# Patient Record
Sex: FEMALE | Race: BLACK OR AFRICAN AMERICAN | NOT HISPANIC OR LATINO | Employment: OTHER | URBAN - METROPOLITAN AREA
[De-identification: names, ages, dates, MRNs, and addresses within clinical notes are randomized per-mention and may not be internally consistent; named-entity substitution may affect disease eponyms.]

---

## 2023-05-03 ENCOUNTER — PATIENT OUTREACH (OUTPATIENT)
Dept: CARE COORDINATION | Facility: CLINIC | Age: 63
End: 2023-05-03
Payer: COMMERCIAL

## 2023-05-30 LAB
ALANINE AMINOTRANSFERASE (SGPT) (U/L) IN SER/PLAS: 5 U/L (ref 7–45)
ALBUMIN (G/DL) IN SER/PLAS: 4.3 G/DL (ref 3.4–5)
ALKALINE PHOSPHATASE (U/L) IN SER/PLAS: 88 U/L (ref 33–136)
ANION GAP IN SER/PLAS: 13 MMOL/L (ref 10–20)
ASPARTATE AMINOTRANSFERASE (SGOT) (U/L) IN SER/PLAS: 13 U/L (ref 9–39)
BASOPHILS (10*3/UL) IN BLOOD BY AUTOMATED COUNT: 0.04 X10E9/L (ref 0–0.1)
BASOPHILS/100 LEUKOCYTES IN BLOOD BY AUTOMATED COUNT: 0.7 % (ref 0–2)
BILIRUBIN TOTAL (MG/DL) IN SER/PLAS: 0.4 MG/DL (ref 0–1.2)
CALCIUM (MG/DL) IN SER/PLAS: 9.6 MG/DL (ref 8.6–10.6)
CARBON DIOXIDE, TOTAL (MMOL/L) IN SER/PLAS: 27 MMOL/L (ref 21–32)
CD3+CD4+ ABSOLUTE: 0.18 X10E9/L (ref 0.35–2.74)
CD3+CD8+ ABSOLUTE: 0.9 X10E9/L (ref 0.08–1.49)
CD4/CD8 RATIO: 0.2 (ref 1–3.5)
CD45%: 100 %
CHLORIDE (MMOL/L) IN SER/PLAS: 106 MMOL/L (ref 98–107)
CHOLESTEROL (MG/DL) IN SER/PLAS: 275 MG/DL (ref 0–199)
CHOLESTEROL IN HDL (MG/DL) IN SER/PLAS: 76.5 MG/DL
CHOLESTEROL/HDL RATIO: 3.6
CP CD3+CD4+%: 14 % (ref 29–57)
CP CD3+CD8+%: 70 % (ref 7–31)
CREATININE (MG/DL) IN SER/PLAS: 1.12 MG/DL (ref 0.5–1.05)
EOSINOPHILS (10*3/UL) IN BLOOD BY AUTOMATED COUNT: 0.07 X10E9/L (ref 0–0.7)
EOSINOPHILS/100 LEUKOCYTES IN BLOOD BY AUTOMATED COUNT: 1.3 % (ref 0–6)
ERYTHROCYTE DISTRIBUTION WIDTH (RATIO) BY AUTOMATED COUNT: 13 % (ref 11.5–14.5)
ERYTHROCYTE MEAN CORPUSCULAR HEMOGLOBIN CONCENTRATION (G/DL) BY AUTOMATED: 32.6 G/DL (ref 32–36)
ERYTHROCYTE MEAN CORPUSCULAR VOLUME (FL) BY AUTOMATED COUNT: 99 FL (ref 80–100)
ERYTHROCYTES (10*6/UL) IN BLOOD BY AUTOMATED COUNT: 4.42 X10E12/L (ref 4–5.2)
FMETH: ABNORMAL
FSIT1: ABNORMAL
GFR FEMALE: 55 ML/MIN/1.73M2
GLUCOSE (MG/DL) IN SER/PLAS: 63 MG/DL (ref 74–99)
HEMATOCRIT (%) IN BLOOD BY AUTOMATED COUNT: 43.8 % (ref 36–46)
HEMOGLOBIN (G/DL) IN BLOOD: 14.3 G/DL (ref 12–16)
IMMATURE GRANULOCYTES/100 LEUKOCYTES IN BLOOD BY AUTOMATED COUNT: 0.5 % (ref 0–0.9)
LDL: 175 MG/DL (ref 0–99)
LEUKOCYTES (10*3/UL) IN BLOOD BY AUTOMATED COUNT: 5.5 X10E9/L (ref 4.4–11.3)
LYMPHOCYTES (10*3/UL) IN BLOOD BY AUTOMATED COUNT: 1.29 X10E9/L (ref 1.2–4.8)
LYMPHOCYTES/100 LEUKOCYTES IN BLOOD BY AUTOMATED COUNT: 23.5 % (ref 13–44)
MONOCYTES (10*3/UL) IN BLOOD BY AUTOMATED COUNT: 0.61 X10E9/L (ref 0.1–1)
MONOCYTES/100 LEUKOCYTES IN BLOOD BY AUTOMATED COUNT: 11.1 % (ref 2–10)
NEUTROPHILS (10*3/UL) IN BLOOD BY AUTOMATED COUNT: 3.44 X10E9/L (ref 1.2–7.7)
NEUTROPHILS/100 LEUKOCYTES IN BLOOD BY AUTOMATED COUNT: 62.9 % (ref 40–80)
NRBC (PER 100 WBCS) BY AUTOMATED COUNT: 0 /100 WBC (ref 0–0)
PLATELETS (10*3/UL) IN BLOOD AUTOMATED COUNT: 366 X10E9/L (ref 150–450)
POTASSIUM (MMOL/L) IN SER/PLAS: 5 MMOL/L (ref 3.5–5.3)
PROTEIN TOTAL: 8 G/DL (ref 6.4–8.2)
SODIUM (MMOL/L) IN SER/PLAS: 141 MMOL/L (ref 136–145)
THYROTROPIN (MIU/L) IN SER/PLAS BY DETECTION LIMIT <= 0.05 MIU/L: 0.38 MIU/L (ref 0.44–3.98)
THYROXINE (T4) FREE (NG/DL) IN SER/PLAS: 1 NG/DL (ref 0.78–1.48)
TRIGLYCERIDE (MG/DL) IN SER/PLAS: 118 MG/DL (ref 0–149)
UREA NITROGEN (MG/DL) IN SER/PLAS: 14 MG/DL (ref 6–23)
VLDL: 24 MG/DL (ref 0–40)

## 2023-05-31 LAB
HIV-1 RNA PCR VIRAL LOAD LOG: NORMAL LOG10 CPY/ML
HIV-1 RNA VIRAL LOAD: NOT DETECTED COPIES/ML

## 2023-06-24 LAB — HEPARIN UNFRACTIONATED IN PPP BY CHROMOGENIC MET: 0.2 IU/ML

## 2023-06-27 ENCOUNTER — PATIENT OUTREACH (OUTPATIENT)
Dept: CARE COORDINATION | Facility: CLINIC | Age: 63
End: 2023-06-27
Payer: COMMERCIAL

## 2023-06-27 NOTE — CARE PLAN
Spoke with patient....enrolled in [employer].   Patient contacted by [facility] and PT will be out this week.

## 2023-07-21 ENCOUNTER — PATIENT OUTREACH (OUTPATIENT)
Dept: CARE COORDINATION | Facility: CLINIC | Age: 63
End: 2023-07-21
Payer: COMMERCIAL

## 2023-07-21 NOTE — CARE PLAN
LATE ENTRY:   Outreach call to patient.   Reviewed meds.  Patient verbalized feeling better and has an upcoming appointment with PCP.   No additional needs noted at this time.       Problem: Access to Care Issue  Goal: Assess and Address Access Barriers  Outcome: Progressing  Note: Late entry from 07/20/2023--Pateint verbalized that she was able to schedule an upcoming appointment is 08/012023.

## 2023-08-11 ENCOUNTER — PATIENT OUTREACH (OUTPATIENT)
Dept: CARE COORDINATION | Facility: CLINIC | Age: 63
End: 2023-08-11
Payer: COMMERCIAL

## 2023-10-20 DIAGNOSIS — Z95.820 PERIPHERAL VASCULAR ANGIOPLASTY STATUS WITH IMPLANTS AND GRAFTS: ICD-10-CM

## 2023-10-25 RX ORDER — RIVAROXABAN 20 MG/1
20 TABLET, FILM COATED ORAL DAILY
Qty: 90 TABLET | Refills: 3 | Status: SHIPPED | OUTPATIENT
Start: 2023-10-25

## 2023-10-25 RX ORDER — CLOPIDOGREL BISULFATE 75 MG/1
75 TABLET ORAL DAILY
Qty: 90 TABLET | Refills: 3 | Status: SHIPPED | OUTPATIENT
Start: 2023-10-25

## 2023-12-18 DIAGNOSIS — B20 HUMAN IMMUNODEFICIENCY VIRUS (MULTI): Primary | ICD-10-CM

## 2023-12-18 RX ORDER — BICTEGRAVIR SODIUM, EMTRICITABINE, AND TENOFOVIR ALAFENAMIDE FUMARATE 50; 200; 25 MG/1; MG/1; MG/1
1 TABLET ORAL DAILY
Qty: 30 TABLET | Refills: 5 | Status: SHIPPED | OUTPATIENT
Start: 2023-12-18 | End: 2024-06-04

## 2024-02-01 ENCOUNTER — APPOINTMENT (OUTPATIENT)
Dept: IMMUNOLOGY | Facility: CLINIC | Age: 64
End: 2024-02-01
Payer: COMMERCIAL

## 2024-02-01 DIAGNOSIS — B20 HIV DISEASE (MULTI): Primary | ICD-10-CM

## 2024-02-01 PROBLEM — C34.91 NON-SMALL CELL CANCER OF RIGHT LUNG (MULTI): Status: ACTIVE | Noted: 2024-02-01

## 2024-02-01 PROBLEM — I73.9 PERIPHERAL VASCULAR DISEASE (CMS-HCC): Status: ACTIVE | Noted: 2022-01-14

## 2024-02-01 PROBLEM — I10 HYPERTENSION: Status: ACTIVE | Noted: 2022-01-14

## 2024-02-01 RX ORDER — LISINOPRIL 40 MG/1
40 TABLET ORAL DAILY
COMMUNITY
End: 2024-05-24 | Stop reason: SDUPTHER

## 2024-02-01 RX ORDER — CILOSTAZOL 100 MG/1
100 TABLET ORAL 2 TIMES DAILY
COMMUNITY

## 2024-02-01 RX ORDER — HYDROCHLOROTHIAZIDE 25 MG/1
25 TABLET ORAL DAILY
COMMUNITY
End: 2024-02-13 | Stop reason: SDUPTHER

## 2024-02-12 RX ORDER — HYDROCHLOROTHIAZIDE 25 MG/1
25 TABLET ORAL DAILY
Qty: 30 TABLET | Refills: 0 | OUTPATIENT
Start: 2024-02-12

## 2024-02-12 NOTE — TELEPHONE ENCOUNTER
This is a historic medication reordered while Pt was in hospital. Pt should contact the original prescriber of medication or PCP for refill and ongoing management.

## 2024-02-13 DIAGNOSIS — I10 HYPERTENSION, UNSPECIFIED TYPE: Primary | ICD-10-CM

## 2024-02-13 RX ORDER — HYDROCHLOROTHIAZIDE 25 MG/1
25 TABLET ORAL DAILY
Qty: 30 TABLET | Refills: 3 | Status: SHIPPED | OUTPATIENT
Start: 2024-02-13 | End: 2024-06-04

## 2024-03-12 ENCOUNTER — OFFICE VISIT (OUTPATIENT)
Dept: IMMUNOLOGY | Facility: CLINIC | Age: 64
End: 2024-03-12
Payer: COMMERCIAL

## 2024-03-12 VITALS
SYSTOLIC BLOOD PRESSURE: 113 MMHG | RESPIRATION RATE: 16 BRPM | WEIGHT: 112 LBS | HEIGHT: 66 IN | DIASTOLIC BLOOD PRESSURE: 74 MMHG | BODY MASS INDEX: 18 KG/M2 | TEMPERATURE: 97.6 F | HEART RATE: 94 BPM | OXYGEN SATURATION: 98 %

## 2024-03-12 DIAGNOSIS — B20 HIV DISEASE (MULTI): Primary | ICD-10-CM

## 2024-03-12 DIAGNOSIS — I10 HYPERTENSION, UNSPECIFIED TYPE: ICD-10-CM

## 2024-03-12 DIAGNOSIS — C34.91 NON-SMALL CELL CANCER OF RIGHT LUNG (MULTI): ICD-10-CM

## 2024-03-12 DIAGNOSIS — Z00.00 HEALTHCARE MAINTENANCE: ICD-10-CM

## 2024-03-12 DIAGNOSIS — I73.9 PERIPHERAL VASCULAR DISEASE (CMS-HCC): ICD-10-CM

## 2024-03-12 PROBLEM — R23.2 HOT FLASHES: Status: ACTIVE | Noted: 2024-03-12

## 2024-03-12 LAB
ALBUMIN SERPL BCP-MCNC: 4.5 G/DL (ref 3.4–5)
ALP SERPL-CCNC: 95 U/L (ref 33–136)
ALT SERPL W P-5'-P-CCNC: 6 U/L (ref 7–45)
ANION GAP SERPL CALC-SCNC: 14 MMOL/L (ref 10–20)
AST SERPL W P-5'-P-CCNC: 13 U/L (ref 9–39)
BASOPHILS # BLD AUTO: 0.05 X10*3/UL (ref 0–0.1)
BASOPHILS NFR BLD AUTO: 0.8 %
BILIRUB SERPL-MCNC: 0.3 MG/DL (ref 0–1.2)
BUN SERPL-MCNC: 15 MG/DL (ref 6–23)
CALCIUM SERPL-MCNC: 9.7 MG/DL (ref 8.6–10.6)
CHLORIDE SERPL-SCNC: 100 MMOL/L (ref 98–107)
CHOLEST SERPL-MCNC: 211 MG/DL (ref 0–199)
CHOLESTEROL/HDL RATIO: 3
CO2 SERPL-SCNC: 28 MMOL/L (ref 21–32)
CREAT SERPL-MCNC: 0.89 MG/DL (ref 0.5–1.05)
EGFRCR SERPLBLD CKD-EPI 2021: 72 ML/MIN/1.73M*2
EOSINOPHIL # BLD AUTO: 0.12 X10*3/UL (ref 0–0.7)
EOSINOPHIL NFR BLD AUTO: 2 %
ERYTHROCYTE [DISTWIDTH] IN BLOOD BY AUTOMATED COUNT: 14 % (ref 11.5–14.5)
GLUCOSE SERPL-MCNC: 73 MG/DL (ref 74–99)
HCT VFR BLD AUTO: 42.5 % (ref 36–46)
HDLC SERPL-MCNC: 71.3 MG/DL
HGB BLD-MCNC: 13.3 G/DL (ref 12–16)
IMM GRANULOCYTES # BLD AUTO: 0.02 X10*3/UL (ref 0–0.7)
IMM GRANULOCYTES NFR BLD AUTO: 0.3 % (ref 0–0.9)
LDLC SERPL CALC-MCNC: 115 MG/DL
LYMPHOCYTES # BLD AUTO: 1.88 X10*3/UL (ref 1.2–4.8)
LYMPHOCYTES NFR BLD AUTO: 31.8 %
MCH RBC QN AUTO: 29.4 PG (ref 26–34)
MCHC RBC AUTO-ENTMCNC: 31.3 G/DL (ref 32–36)
MCV RBC AUTO: 94 FL (ref 80–100)
MONOCYTES # BLD AUTO: 0.6 X10*3/UL (ref 0.1–1)
MONOCYTES NFR BLD AUTO: 10.2 %
NEUTROPHILS # BLD AUTO: 3.24 X10*3/UL (ref 1.2–7.7)
NEUTROPHILS NFR BLD AUTO: 54.9 %
NON HDL CHOLESTEROL: 140 MG/DL (ref 0–149)
NRBC BLD-RTO: 0 /100 WBCS (ref 0–0)
PLATELET # BLD AUTO: 443 X10*3/UL (ref 150–450)
POTASSIUM SERPL-SCNC: 4.6 MMOL/L (ref 3.5–5.3)
PROT SERPL-MCNC: 8.5 G/DL (ref 6.4–8.2)
RBC # BLD AUTO: 4.53 X10*6/UL (ref 4–5.2)
SODIUM SERPL-SCNC: 137 MMOL/L (ref 136–145)
TRIGL SERPL-MCNC: 125 MG/DL (ref 0–149)
VLDL: 25 MG/DL (ref 0–40)
WBC # BLD AUTO: 5.9 X10*3/UL (ref 4.4–11.3)

## 2024-03-12 PROCEDURE — 80061 LIPID PANEL: CPT | Performed by: INTERNAL MEDICINE

## 2024-03-12 PROCEDURE — 3078F DIAST BP <80 MM HG: CPT | Performed by: INTERNAL MEDICINE

## 2024-03-12 PROCEDURE — 1036F TOBACCO NON-USER: CPT | Performed by: INTERNAL MEDICINE

## 2024-03-12 PROCEDURE — 88185 FLOWCYTOMETRY/TC ADD-ON: CPT | Mod: TC | Performed by: INTERNAL MEDICINE

## 2024-03-12 PROCEDURE — 3008F BODY MASS INDEX DOCD: CPT | Performed by: INTERNAL MEDICINE

## 2024-03-12 PROCEDURE — 3074F SYST BP LT 130 MM HG: CPT | Performed by: INTERNAL MEDICINE

## 2024-03-12 PROCEDURE — 85025 COMPLETE CBC W/AUTO DIFF WBC: CPT | Performed by: INTERNAL MEDICINE

## 2024-03-12 PROCEDURE — 99215 OFFICE O/P EST HI 40 MIN: CPT | Performed by: INTERNAL MEDICINE

## 2024-03-12 PROCEDURE — 87536 HIV-1 QUANT&REVRSE TRNSCRPJ: CPT | Performed by: INTERNAL MEDICINE

## 2024-03-12 PROCEDURE — 1159F MED LIST DOCD IN RCRD: CPT | Performed by: INTERNAL MEDICINE

## 2024-03-12 PROCEDURE — 80053 COMPREHEN METABOLIC PANEL: CPT | Performed by: INTERNAL MEDICINE

## 2024-03-12 PROCEDURE — 1126F AMNT PAIN NOTED NONE PRSNT: CPT | Performed by: INTERNAL MEDICINE

## 2024-03-12 PROCEDURE — 36415 COLL VENOUS BLD VENIPUNCTURE: CPT | Performed by: INTERNAL MEDICINE

## 2024-03-12 RX ORDER — ATORVASTATIN CALCIUM 40 MG/1
40 TABLET, FILM COATED ORAL DAILY
Qty: 30 TABLET | Refills: 5 | Status: SHIPPED | OUTPATIENT
Start: 2024-03-12 | End: 2024-06-05 | Stop reason: ALTCHOICE

## 2024-03-12 ASSESSMENT — PAIN SCALES - GENERAL: PAINLEVEL: 0-NO PAIN

## 2024-03-12 NOTE — ASSESSMENT & PLAN NOTE
Seems lost to follow up, as last visit 2/23 and was supposed to follow up in 6 months with CT before. With her right upper chest pain, will get the CT and then back to oncology

## 2024-03-12 NOTE — PROGRESS NOTES
"Subjective   Rosenda Anderson is a 63 y.o. female who presents to the [facility] for follow-up of HIV infection.     Has had a tough couple of years.  Finally got her left leg revascularized, doing ok.  But son convicted of murder under influence of PCP(aka \"water\"),he doesn't remember it. Was in the news end of .  Her mother  on Valentines day-they had her home with hospice. She is still getting calls from hospice to help with grief. Then her good friend fell at the repast and had very severe open leg fractures.   But despite all she is taking her medicine everyday!  Only thing is she has noted some right upper breast/pectoralis pain-no mass there.  Objective   Vitals:    24 1437   BP: 113/74   Pulse: 94   Resp: 16   Temp: 36.4 °C (97.6 °F)   SpO2: 98%        Current Outpatient Medications:     barjooesv-yiubolqk-epdwyow ala (Biktarvy) -25 mg tablet, TAKE 1 TABLET BY MOUTH DAILY, Disp: 30 tablet, Rfl: 5    cilostazol (Pletal) 100 mg tablet, Take 1 tablet (100 mg) by mouth 2 times a day., Disp: , Rfl:     hydroCHLOROthiazide (HYDRODiuril) 25 mg tablet, Take 1 tablet (25 mg) by mouth once daily., Disp: 30 tablet, Rfl: 3    lisinopril 40 mg tablet, Take 1 tablet (40 mg) by mouth once daily., Disp: , Rfl:     Xarelto 20 mg tablet, TAKE 1 TABLET DAILY, Disp: 90 tablet, Rfl: 3    atorvastatin (Lipitor) 40 mg tablet, Take 1 tablet (40 mg) by mouth once daily., Disp: 30 tablet, Rfl: 5    clopidogrel (Plavix) 75 mg tablet, TAKE 1 TABLET DAILY (Patient not taking: Reported on 3/12/2024), Disp: 90 tablet, Rfl: 3   Physical Exam  Physical Exam  Constitutional:       General: not in acute distress.     Appearance: Normal appearance.   HENT:      Head: Normocephalic and atraumatic.      Pharynx: Oropharynx is clear. No oropharyngeal exudate. Prominent right parotid with no discrete mass  Eyes:      General: No scleral icterus.  Breast:no masses on right, no R axilllary JAYLENE, Area of tenderness is more over the " "pectoralis than the breat, ? muscular  Cardiovascular:      Rate and Rhythm: Normal rate and regular rhythm.   Pulmonary:      Breath sounds: Normal breath sounds. No wheezing or rhonchi.   Abdominal:      Palpations: Abdomen is soft.      Tenderness: There is no abdominal tenderness.   Musculoskeletal:      Cervical back: Neck supple.      Right lower leg: No edema.      Left lower leg: No edema.   Skin:     Findings: No rash.   Neurological:      Mental Status: alert.   Psychiatric:         Mood and Affect: Mood normal.     Laboratory  Lab Results   Component Value Date    [id number] NOT DETECTED 06/23/2023    [id number] 0.181 (L) 05/30/2023      Lab Results   Component Value Date    WBC 10.6 06/26/2023    HGB 9.0 (L) 06/26/2023    HCT 27.4 (L) 06/26/2023     06/26/2023     06/26/2023      Lab Results   Component Value Date    GLUCOSE 73 (L) 03/12/2024    CALCIUM 9.7 03/12/2024     03/12/2024    K 4.6 03/12/2024    CO2 28 03/12/2024     03/12/2024    BUN 15 03/12/2024    CREATININE 0.89 03/12/2024      Lab Results   Component Value Date    CHOL 211 (H) 03/12/2024    CHOL 275 (H) 05/30/2023    CHOL 181 07/04/2022     Lab Results   Component Value Date    HDL 71.3 03/12/2024    HDL 76.5 05/30/2023    HDL 60.3 07/04/2022     Lab Results   Component Value Date    LDLCALC 115 (H) 03/12/2024     Lab Results   Component Value Date    TRIG 125 03/12/2024    TRIG 118 05/30/2023    TRIG 70 07/04/2022     No components found for: \"CHOLHDL\"      Assessment/Plan   Problem List Items Addressed This Visit       Hypertension    Current Assessment & Plan     Good on lisinopril         Peripheral vascular disease (CMS/HCC)    Current Assessment & Plan     Not having rest pain or pain with walking anymore         Relevant Medications    atorvastatin (Lipitor) 40 mg tablet    Other Relevant Orders    Lipid Panel (Completed)    Non-small cell cancer of right lung (CMS/HCC)    Current Assessment & Plan     " Seems lost to follow up, as last visit 2/23 and was supposed to follow up in 6 months with CT before. With her right upper chest pain, will get the CT and then back to oncology         Relevant Orders    CT chest w IV contrast    HIV disease (CMS/HCC) - Primary    Current Assessment & Plan     Taking her biktarvy everyday, check labs         Relevant Orders    CD4/8 Panel    CBC and Auto Differential    Comprehensive Metabolic Panel (Completed)    HIV RNA, quantitative, PCR     Other Visit Diagnoses       Healthcare maintenance        Relevant Orders    BI mammo bilateral screening tomosynthesis           Health Maintenance  Due for mammogram, ordered  Not sexually active, no STI screen  Lindsay Kang MD

## 2024-03-13 LAB
CD3+CD4+ CELLS # BLD: 0.28 X10E9/L
CD3+CD4+ CELLS # BLD: 282 /MM3
CD3+CD4+ CELLS NFR BLD: 15 %
CD3+CD4+ CELLS/CD3+CD8+ CLL BLD: 0.23 %
CD3+CD8+ CELLS # BLD: 1.22 X10E9/L
CD3+CD8+ CELLS NFR BLD: 65 %
HIV1 RNA # PLAS NAA DL=20: NOT DETECTED {COPIES}/ML
HIV1 RNA SPEC NAA+PROBE-LOG#: NORMAL {LOG_COPIES}/ML
LYMPHOCYTES # SPEC AUTO: 1.88 X10*3/UL

## 2024-04-02 ENCOUNTER — TELEPHONE (OUTPATIENT)
Dept: RADIATION ONCOLOGY | Facility: HOSPITAL | Age: 64
End: 2024-04-02
Payer: COMMERCIAL

## 2024-04-03 ENCOUNTER — HOSPITAL ENCOUNTER (OUTPATIENT)
Dept: RADIOLOGY | Facility: HOSPITAL | Age: 64
Discharge: HOME | End: 2024-04-03
Payer: COMMERCIAL

## 2024-04-03 ENCOUNTER — HOSPITAL ENCOUNTER (OUTPATIENT)
Dept: RADIATION ONCOLOGY | Facility: HOSPITAL | Age: 64
Setting detail: RADIATION/ONCOLOGY SERIES
Discharge: HOME | End: 2024-04-03
Payer: COMMERCIAL

## 2024-04-03 VITALS — BODY MASS INDEX: 18.16 KG/M2 | HEIGHT: 66 IN | WEIGHT: 113 LBS

## 2024-04-03 VITALS
BODY MASS INDEX: 18.15 KG/M2 | RESPIRATION RATE: 18 BRPM | SYSTOLIC BLOOD PRESSURE: 115 MMHG | WEIGHT: 112.43 LBS | OXYGEN SATURATION: 94 % | HEART RATE: 109 BPM | DIASTOLIC BLOOD PRESSURE: 79 MMHG | TEMPERATURE: 96.8 F

## 2024-04-03 DIAGNOSIS — Z00.00 HEALTHCARE MAINTENANCE: ICD-10-CM

## 2024-04-03 DIAGNOSIS — C34.91 NON-SMALL CELL CANCER OF RIGHT LUNG (MULTI): Primary | ICD-10-CM

## 2024-04-03 DIAGNOSIS — C34.91 NON-SMALL CELL CANCER OF RIGHT LUNG (MULTI): ICD-10-CM

## 2024-04-03 PROCEDURE — 71260 CT THORAX DX C+: CPT

## 2024-04-03 PROCEDURE — 99214 OFFICE O/P EST MOD 30 MIN: CPT | Performed by: NURSE PRACTITIONER

## 2024-04-03 PROCEDURE — 71260 CT THORAX DX C+: CPT | Performed by: RADIOLOGY

## 2024-04-03 PROCEDURE — 77063 BREAST TOMOSYNTHESIS BI: CPT | Performed by: STUDENT IN AN ORGANIZED HEALTH CARE EDUCATION/TRAINING PROGRAM

## 2024-04-03 PROCEDURE — 77067 SCR MAMMO BI INCL CAD: CPT | Performed by: STUDENT IN AN ORGANIZED HEALTH CARE EDUCATION/TRAINING PROGRAM

## 2024-04-03 PROCEDURE — 77067 SCR MAMMO BI INCL CAD: CPT

## 2024-04-03 PROCEDURE — 2550000001 HC RX 255 CONTRASTS: Mod: SE | Performed by: INTERNAL MEDICINE

## 2024-04-03 RX ADMIN — IOHEXOL 70 ML: 350 INJECTION, SOLUTION INTRAVENOUS at 11:27

## 2024-04-03 ASSESSMENT — PAIN SCALES - GENERAL: PAINLEVEL: 0-NO PAIN

## 2024-04-03 ASSESSMENT — ENCOUNTER SYMPTOMS
NEUROLOGICAL NEGATIVE: 1
OCCASIONAL FEELINGS OF UNSTEADINESS: 0
FEVER: 0
DIAPHORESIS: 0
CARDIOVASCULAR NEGATIVE: 1
CHEST TIGHTNESS: 0
WHEEZING: 0
STRIDOR: 0
SHORTNESS OF BREATH: 1
FATIGUE: 0
LOSS OF SENSATION IN FEET: 0
PSYCHIATRIC NEGATIVE: 1
HEMATOLOGIC/LYMPHATIC NEGATIVE: 1
UNEXPECTED WEIGHT CHANGE: 0
APPETITE CHANGE: 0
MUSCULOSKELETAL NEGATIVE: 1
CHILLS: 0
CHOKING: 0
GASTROINTESTINAL NEGATIVE: 1
ACTIVITY CHANGE: 0
DEPRESSION: 0
APNEA: 0
COUGH: 1

## 2024-04-03 ASSESSMENT — COLUMBIA-SUICIDE SEVERITY RATING SCALE - C-SSRS
2. HAVE YOU ACTUALLY HAD ANY THOUGHTS OF KILLING YOURSELF?: NO
6. HAVE YOU EVER DONE ANYTHING, STARTED TO DO ANYTHING, OR PREPARED TO DO ANYTHING TO END YOUR LIFE?: NO
1. IN THE PAST MONTH, HAVE YOU WISHED YOU WERE DEAD OR WISHED YOU COULD GO TO SLEEP AND NOT WAKE UP?: NO

## 2024-04-03 ASSESSMENT — PATIENT HEALTH QUESTIONNAIRE - PHQ9
SUM OF ALL RESPONSES TO PHQ9 QUESTIONS 1 AND 2: 0
2. FEELING DOWN, DEPRESSED OR HOPELESS: NOT AT ALL
1. LITTLE INTEREST OR PLEASURE IN DOING THINGS: NOT AT ALL

## 2024-04-03 NOTE — PROGRESS NOTES
Patient ID: [id number]     Cancer Staging:   Treatment Synopsis:    62-year-old patient who had a routine CT scan of the chest done on April 22, 2021 which showed a spiculated nodule measuring 1.8 cm which in the right upper for lung.   There were multiple other nonspecific pulmonary nodules including a 5 mm nodule within the left upper lobe a 4 mm nodule within the right lower lobe and when these images are compared to imaging on a coronary artery CT angiogram of December 20, 2018  and most of the nodules are chronic. She was sent for a PET scan on June 2, 2021 showing hypermetabolic activity  within the right upper lobe lesion with a maximum SUV of 11.1.  There was some nonspecific mild subcentimeter bilateral axillary lymphadenopathy.  As she was undergoing these process she was admitted on May 20, 2021 for a limb ischemia. She had stopped  her aspirin and Plavix a week before the biopsy and she developed an occluded stent on the left side.  This required intervention for acute arterial obstruction on the left side with the revascularization.  She is was put back on anticoagulation.     DIAGNOSIS     Poorly differentiated adenocarcinoma of the lung.  Date of diagnosis is May 17, 2021 from a right upper lobe CT-guided core biopsy.  Immunohistochemistry positive for TTF-1 and negative for P 40.     Unable to have surgery due to not being able to hold anticoagulation.     SBRT completed on 8/31/21.       History of presenting illness    Rosenda Anderson is a 63 y.o. female who presents today for follow up 2 years and 8 months s/p SBRT to the right lung. Patient was last seen in clinic in January 2023. Overall, patient feels good. Energy is baseline. It varies day to day. She has been going through a lot at home. Her mother passed on [employer]  Day this year and her Aunt a week later.  Appetite good. Weight stable.  Endorses non productive cough and SOB with stairs. Per patient she has intermittent pain on right side  in RT field.  Denies  back pain or fevers. No oxygen or inhaler requirements. No neurological complaints.       Review of systems:  Review of Systems   Constitutional:  Negative for activity change, appetite change, chills, diaphoresis, fatigue, fever and unexpected weight change.   HENT: Negative.     Respiratory:  Positive for cough and shortness of breath. Negative for apnea, choking, chest tightness, wheezing and stridor.    Cardiovascular: Negative.    Gastrointestinal: Negative.    Musculoskeletal: Negative.    Skin: Negative.    Neurological: Negative.    Hematological: Negative.    Psychiatric/Behavioral: Negative.         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        Last recorded vital:  /79   Pulse 109   Temp 36 °C (96.8 °F) (Skin)   Resp 18   Wt 51 kg (112 lb 7 oz)   LMP  (LMP Unknown)   SpO2 94%   BMI 18.15 kg/m²     Physical exam  Physical Exam  Constitutional:       General: She is not in acute distress.     Appearance: Normal appearance. She is not ill-appearing, toxic-appearing or diaphoretic.   HENT:      Head: Normocephalic.   Cardiovascular:      Rate and Rhythm: Normal rate and regular rhythm.      Pulses: Normal pulses.      Heart sounds: Normal heart sounds.   Pulmonary:      Effort: Pulmonary effort is normal. No respiratory distress.      Breath sounds: Normal breath sounds. No stridor. No wheezing, rhonchi or rales.   Abdominal:      General: Bowel sounds are normal. There is no distension.      Palpations: Abdomen is soft. There is no mass.      Tenderness: There is no abdominal tenderness. There is no guarding or rebound.      Hernia: No hernia is  present.   Musculoskeletal:         General: Normal range of motion.      Cervical back: Normal range of motion and neck supple.   Skin:     General: Skin is warm and dry.   Neurological:      General: No focal deficit present.      Mental Status: She is alert and oriented to person, place, and time.   Psychiatric:         Mood and Affect: Mood normal.         Behavior: Behavior normal.         Thought Content: Thought content normal.         Judgment: Judgment normal.         Radiology results:  CT chest w IV contrast    Result Date: 4/3/2024  Interpreted By:  Art Heredia and Meyers Emily STUDY: CT CHEST W IV CONTRAST;  4/3/2024 11:42 am   INDICATION: Signs/Symptoms:history of RUL CA s/p XRT, follow up. Now also with right pectoralis pain.   COMPARISON: CT chest with contrast 01/17/2023, PET-CT 07/15/2022, CT angio chest for PE 02/22/2022, CT chest with contrast 11/23/2021 CT chest 04/22/2021   ACCESSION NUMBER(S): [id number]   ORDERING CLINICIAN: BENITEZ TURPIN   TECHNIQUE: Helical data acquisition of the chest was obtained following intravenous administration of 70 mL Omnipaque 350. Images were reformatted in axial, coronal, and sagittal planes.   FINDINGS: LUNGS AND AIRWAYS: The trachea and central airways are patent. There is slight interval increase in layering soft tissue within the right bronchus intermedius and upper lobe bronchus. This may represent adherent or aspirated material   Apical predominant paraseptal and centrilobular emphysematous change. Similar to slightly decreased size of several right upper lobe nodular densities. For example, there is a 1.3 x 0.9 cm more medial nodular density (series 203, image 98), previously measuring up to 1.5 x 0.8 cm. Further, there is a slightly more lateral nodular density measuring up to 1.5 x 0.8 cm (series 203, image 98), previously measuring up to 1.5 x 0.9 cm additional nodular densities are overall stable when compared to prior exam. There is  associated right upper lobe architectural distortion with traction bronchiectasis, likely representing postradiation change. There is a 0.4 cm right lower lobe pleural-based pulmonary nodule (series 203, image 171), previously measuring up to 0.4 cm. Further, there is similar appearance of a 0.3 cm left upper lobe solid pulmonary nodule (series 203, image 116), previously measuring up to 0.3 cm.   MEDIASTINUM AND LIZZIE, LOWER NECK AND AXILLA: There are few small hypoattenuating nodules measuring equal to or greater than 1.5 cm in the left lobe of thyroid gland. Further evaluation with nonemergent thyroid ultrasound. (Managing Incidental Thyroid Nodules Detected on Imaging: White Paper of the ACR Incidental Thyroid Findings Committee. Leyla Carlos. et al. Journal of the American College of Radiology,Volume 12, Issue 2, 143 - 150.) No evidence of thoracic lymphadenopathy by CT criteria. Esophagus appears within normal limits as seen.   HEART AND VESSELS: The thoracic aorta normal in course and caliber. Although, the study is not tailored for evaluation of aorta, there is no definite evidence of acute aortic pathology. Main pulmonary artery and its branches are normal in caliber. Moderate coronary artery calcifications are seen. Please note,the study is not optimized for evaluation of coronary arteries. The cardiac chambers are not enlarged. There is no pericardial effusion seen.   UPPER ABDOMEN: Redemonstration of a 2.0 cm enhancing lesion in hepatic segment 2 (202, image 53), further evaluated on CT liver protocol to represent a hepatic hemangioma. Similarly, enhancing lesion within hepatic segment 4A (series 202, image 50), stable when compared to prior exam and again characterized as a hepatic hemangioma CT liver protocol. Surgical clips are again noted throughout the liver and along the partially visualized inferior aspect of the spleen. Otherwise, remaining partially visualized subdiaphragmatic structures are  unremarkable.   CHEST WALL AND OSSEOUS STRUCTURES: Chest wall is within normal limits. No acute osseous pathology. New fracture deformity of the right anterolateral 3rd rib with increased sclerotic change which likely represents a remote injury. There is overlying mild right pectoralis minor musculature edema. Further, there is adjacent soft tissue thickening (series 203, image 98). Generalized osteopenia of the osseous structures of the spine.       1. Slight interval decrease in several right upper lobe nodular densities within the postradiation field when compared to prior exam, likely secondary to evolving postradiation change. However, residual/viable tumor cannot be entirely excluded. Continued attention on follow-up is recommended. 2. 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 Consider further evaluation with MRI. 3. Few hypodense thyroid nodules measuring up to 1.5 cm in the left thyroid lobe. Again, consider further evaluation with nonemergent thyroid ultrasound. 4. Additional findings as detailed above.   I personally reviewed the images/study, and I agree with the findings as stated above. This study was interpreted at [place], [place].   MACRO: Critical Finding:  See findings. Notification was initiated on 4/3/2024 at 2:45 pm by  Melissa Kelsey.  (**-YCF-**) Instructions:   Signed by: Art Heredia 4/3/2024 4:13 PM Dictation workstation:   [id number]    BI mammo bilateral screening tomosynthesis    Result Date: 4/3/2024  Interpreted By:  Kody Ellison, STUDY: BI MAMMO BILATERAL SCREENING TOMOSYNTHESIS;  4/3/2024 9:43 am   ACCESSION NUMBER(S): [id number]   ORDERING CLINICIAN: BENITEZ TURPIN   INDICATION: Screening.    COMPARISON: 07/19/2022 and 03/17/2020   FINDINGS: 2D and tomosynthesis images were reviewed at 1 mm slice thickness.   Density:  The breast tissue is heterogeneously dense, which may obscure small masses.   No suspicious masses or calcifications are identified.       No mammographic evidence of malignancy.   Based on the Tyrer-Cuzick model for breast cancer risk assessment, the patient's lifetime risk of breast cancer is 4.0%. Patients with over a 20% lifetime risk of developing breast cancer may benefit from additional screening with breast MRI or ultrasound. Please note that this estimate is based on responses provided on the patient questionnaire. For more information regarding high risk consultation, please call [phone].   BI-RADS CATEGORY:   BI-RADS Category:  1 Negative. Recommendation:  Annual Screening. Recommended Date:  1 Year. Laterality:  Bilateral.   For any future breast imaging appointments, please call [phone] ([phone]).     MACRO: None   Signed by: Kody Ellison 4/3/2024 11:09 AM Dictation workstation:   [id number]          Plan:  Assessment/Plan     63 year old female 2 years and 8 months s/p SBRT to the right lung. Patient doing well. No acute complaints related to radiation. CT scan from today shows slight interval decrease in several right upper lobe nodular densities within the postradiation field when compared to prior exam, likely secondary to evolving postradiation change.  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 Will order dedicated MRI for further evaluation.  Few hypodense thyroid nodules measuring up to 1.5 cm in the left thyroid lobe. Will order thyroid ultrasound for patient to have at her convenience. Will call with results of MRI and  ultrasound.   Patient to return to clinic in 6 months with same day CT of the chest unless needs to be seen sooner.   Instructed to call with any questions or concerns.

## 2024-04-16 ENCOUNTER — HOSPITAL ENCOUNTER (OUTPATIENT)
Dept: RADIOLOGY | Facility: HOSPITAL | Age: 64
Discharge: HOME | End: 2024-04-16
Payer: COMMERCIAL

## 2024-04-16 DIAGNOSIS — C34.91 NON-SMALL CELL CANCER OF RIGHT LUNG (MULTI): ICD-10-CM

## 2024-04-16 PROCEDURE — 76536 US EXAM OF HEAD AND NECK: CPT

## 2024-04-16 PROCEDURE — 76536 US EXAM OF HEAD AND NECK: CPT | Performed by: RADIOLOGY

## 2024-04-18 ENCOUNTER — TELEPHONE (OUTPATIENT)
Dept: RADIATION ONCOLOGY | Facility: HOSPITAL | Age: 64
End: 2024-04-18
Payer: COMMERCIAL

## 2024-04-18 DIAGNOSIS — C34.91 NON-SMALL CELL CANCER OF RIGHT LUNG (MULTI): Primary | ICD-10-CM

## 2024-04-18 NOTE — TELEPHONE ENCOUNTER
Called patient to discuss results of recent thyroid ultrasound. Explained that they were suspicious and close monitoring was recommended. Will refer to ENT.     US thyroid 04/16/2024    Narrative  Interpreted By:  Cornelio Calabrese and Baker Zachary  STUDY:  US THYROID;  4/16/2024 10:01 am    INDICATION:  Signs/Symptoms:Few hypodense nodules on CT of the chest from 4/3/24.    COMPARISON:  CT chest on 04/03/2024.    ACCESSION NUMBER(S):  [id number]    ORDERING CLINICIAN:  MARI JULIEN    TECHNIQUE:  Multiple ultrasonographic images of the thyroid gland were obtained.    FINDINGS:  The thyroid is normal in size and heterogeneous in appearance, with  multiple nodules identified.    RIGHT LOBE:  The right lobe of the thyroid measures 1.8 cm  x 1.5 cm x 4.6 cm. The  right lobe of the thyroid is heterogenous in echotexture and  demonstrates multiple nodules.    Nodule 1:  Within the mid thyroid lobe there is a nodule with the following  features: Size: 1.2 x 0.8 x 1.1 cm  Composition: Solid or almost completely solid (2)  Echogenicity: Hyperechoic or isoechoic (1)  Shape: Wider-than-tall (0)  Margin: Smooth (0)  Echogenic Foci: None or Large comet-tail artifacs (0)  The total score of this nodule is 3 corresponding to a TI-RADS  category 3; Mildly suspicious. FNA is recommended if >2.5cm, Follow  up if >1.5cm in 1, 3, and 5 years.    Nodule 2:  Within the lower thyroid lobe there is a nodule with the following  features: Size: 0.6 x 0.4 x 0.7 cm  Composition: mixed cystic and solid (1)  Echogenicity: Hypoechoic (2)  Shape: Wider-than-tall (0)  Margin: Smooth (0)  Echogenic Foci: None or Large comet-tail artifacs (0)  The total score of this nodule is 3 corresponding to a TI-RADS  category 3; Mildly suspicious. FNA is recommended if >2.5cm, Follow  up if >1.5cm in 1, 3, and 5 years.      LEFT LOBE:  The left lobe of the thyroid measures 1.7 cm x 1.8 cm x 4.6 cm. The  left lobe of the thyroid is heterogenous in  echotexture and  demonstrates multiple nodules.    Nodule 1:  Within the mid thyroid lobe there is a nodule with the following  features: Size: 1.6 x 1.1 x 0.4 cm  Composition: Solid or almost completely solid (2)  Echogenicity: Hyperechoic or isoechoic (1)  Shape: Wider-than-tall (0)  Margin: Ill-defined (0)  Echogenic Foci: None or Large comet-tail artifacs (0)  The total score of this nodule is 3 corresponding to a TI-RADS  category 3; Mildly suspicious. FNA is recommended if >2.5cm, Follow  up if >1.5cm in 1, 3, and 5 years.    Nodule 2:  Within the lower thyroid lobe there is a nodule with the following  features: Size: 0.6 x 0.4 x 0.5 cm  Composition: Solid or almost completely solid (2)  Echogenicity: Hyperechoic or isoechoic (1)  Shape: Wider-than-tall (0)  Margin: Smooth (0)  Echogenic Foci: None or Large comet-tail artifacs (0)  The total score of this nodule is 3 corresponding to a TI-RADS  category 3; Mildly suspicious. FNA is recommended if >2.5cm, Follow  up if >1.5cm in 1, 3, and 5 years.      ISTHMUS:  The isthmus measures approximately 0.4 cm and is homogeneous in  echotexture and without any identifiable nodules.    CERVICAL LYMPH NODES:  No cervical lymph adenopathy is present.    Impression  The thyroid is heterogenous in echotexture with multiple nodules as  described below:  1. A 1.6 cm nodule in the left mid thyroid gland is category 3. Based  on size criteria follow-up as detailed above is recommended.  2. Multiple additional TI-RADS 3 nodules throughout the thyroid gland  which requires no follow-up based on size criteria.    I personally reviewed the images/study and I agree with the findings  as stated by Dr. Marcel Verma M.D. This study was interpreted at  [facility], [place], [place].      Please note that these statements are based on the recommendations of  the American College of Radiology published in:    Shelton WD, Javier SA, Merrill CC, et al.  Multiinstitutional  Analysis of Thyroid Nodule Risk Stratification Using the American  College of Radiology Thyroid Imaging Reporting and Data System. AJR  Am J Roentgenol. 2017;:1-11.    Michael FN et al. ACR Thyroid Imaging, Reporting and Data System  (TI-RADS): White Paper of the ACR TI-RADS Committee. J Am Cristopher  Radiol. 2017 May; 14(5):587-595.    Lozano EJ, Na DG, Semaj JH, Joaquim JY, Kristina JH, Min SY. US Fine-Needle  Aspiration Biopsy for Thyroid Malignancy: Diagnostic Performance of  Seven Society Guidelines Applied to 2000 Thyroid Nodules. Radiology.  2018;287(3):893-900.      MACRO:  None    Signed by: Cornelio Calabrese 4/16/2024 11:29 AM  Dictation workstation:   [id number]

## 2024-04-23 ENCOUNTER — HOSPITAL ENCOUNTER (OUTPATIENT)
Dept: RADIOLOGY | Facility: HOSPITAL | Age: 64
Discharge: HOME | End: 2024-04-23
Payer: COMMERCIAL

## 2024-04-23 DIAGNOSIS — C34.91 NON-SMALL CELL CANCER OF RIGHT LUNG (MULTI): ICD-10-CM

## 2024-04-23 PROCEDURE — A9575 INJ GADOTERATE MEGLUMI 0.1ML: HCPCS | Mod: SE | Performed by: NURSE PRACTITIONER

## 2024-04-23 PROCEDURE — 71552 MRI CHEST W/O & W/DYE: CPT

## 2024-04-23 PROCEDURE — 2550000001 HC RX 255 CONTRASTS: Mod: SE | Performed by: NURSE PRACTITIONER

## 2024-04-23 PROCEDURE — 71552 MRI CHEST W/O & W/DYE: CPT | Performed by: RADIOLOGY

## 2024-04-23 RX ORDER — GADOTERATE MEGLUMINE 376.9 MG/ML
10 INJECTION INTRAVENOUS
Status: COMPLETED | OUTPATIENT
Start: 2024-04-23 | End: 2024-04-23

## 2024-04-23 RX ADMIN — GADOTERATE MEGLUMINE 10 ML: 376.9 INJECTION INTRAVENOUS at 10:54

## 2024-04-24 DIAGNOSIS — C34.91 NON-SMALL CELL CANCER OF RIGHT LUNG (MULTI): Primary | ICD-10-CM

## 2024-04-24 RX ORDER — GABAPENTIN 300 MG/1
300 CAPSULE ORAL 3 TIMES DAILY
Qty: 90 CAPSULE | Refills: 1 | Status: SHIPPED | OUTPATIENT
Start: 2024-04-24 | End: 2024-06-23

## 2024-04-24 NOTE — PROGRESS NOTES
Called patient to discuss recent MRI of the chest. Scan shows subacute fracture of the anterolateral aspect of right 3rd rib. Patient has been complaining of some discomfort there for quite some time. Will try gabapentin. Patient to call in a few weeks if not helping. Script sent. Will continue to monitor chest with surveillance CT's of the chest for history of lung cancer. Next CT/follow up scheduled for October. Instructed to call with any questions or concerns.     MR chest w and wo IV contrast 04/23/2024    Narrative  Interpreted By:  Shakira Escudero and Guraya Sahejmeet  STUDY:  MRI of the  right  chest wall with and without contrast dated  4/23/2024.    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    COMPARISON:  CT chest 04/03/2024    ACCESSION NUMBER(S):  [id number]    ORDERING CLINICIAN:  MARI JULIEN    TECHNIQUE:  Multiplanar multisequence MRI of the  right chest wall was performed  with and without intravenous gadolinium based contrast.    FINDINGS:  Note that this study is limited due to respiratory motion artifact.    Bones and joints:    When accounting for differences in exam technique there is similar  appearance of a subacute nondisplaced fracture of the anterolateral  aspect of the right 3rd rib with adjacent marrow edema and  surrounding soft tissue swelling. Note is also made of T2  hyperintense signal abnormality involving the posterior and lateral  aspects of the 1st and 2nd ribs near the costovertebral junction  without a readily definable fracture line.      The joints are not well assessed due to large field-of-view imaging,  within this limitation there is no large shoulder joint effusion.    Soft tissues:    The visualized muscles and tendons are intact. Ligaments are not well  assessed on this examination.    Signal abnormality of the right lung likely correlates with post  radiation lung disease.    Impression  Similar alignment of a nondisplaced subacute fracture involving  the  anterolateral aspect of the right 3rd rib.    Edema like marrow signal observed of the posteromedial 1st and 2nd  ribs near the costovertebral junctions likely related to radiation  therapy with early osteonecrosis not ruled out. No fractures in these  locations.    Signal abnormality of the right lung likely correlates with post  radiation lung disease, better evaluated on the previous CT chest.    MACRO:  None    Signed by: Shakira Escudero 4/23/2024 3:38 PM  Dictation workstation:   [id number]

## 2024-05-23 DIAGNOSIS — I10 HYPERTENSION, UNSPECIFIED TYPE: ICD-10-CM

## 2024-05-24 RX ORDER — LISINOPRIL 40 MG/1
40 TABLET ORAL DAILY
Qty: 30 TABLET | Refills: 3 | Status: SHIPPED | OUTPATIENT
Start: 2024-05-24

## 2024-06-03 DIAGNOSIS — I10 HYPERTENSION, UNSPECIFIED TYPE: ICD-10-CM

## 2024-06-03 DIAGNOSIS — B20 HUMAN IMMUNODEFICIENCY VIRUS (MULTI): ICD-10-CM

## 2024-06-04 PROBLEM — I70.209 SUPERFICIAL FEMORAL ARTERY OCCLUSION (CMS-HCC): Status: ACTIVE | Noted: 2024-06-04

## 2024-06-04 PROBLEM — I10 BENIGN ESSENTIAL HYPERTENSION: Status: ACTIVE | Noted: 2022-03-03

## 2024-06-04 PROBLEM — I70.229 CRITICAL LIMB ISCHEMIA WITH HISTORY OF REVASCULARIZATION OF SAME EXTREMITY (MULTI): Status: ACTIVE | Noted: 2024-06-04

## 2024-06-04 PROBLEM — I70.202: Status: ACTIVE | Noted: 2022-01-14

## 2024-06-04 PROBLEM — R68.89 NAIL FINDING: Status: ACTIVE | Noted: 2024-06-04

## 2024-06-04 PROBLEM — Z86.19 FREQUENT INFECTIONS: Status: ACTIVE | Noted: 2022-03-03

## 2024-06-04 PROBLEM — Z98.62 HISTORY OF ANGIOPLASTY OF PERIPHERAL VESSEL: Status: ACTIVE | Noted: 2024-06-04

## 2024-06-04 PROBLEM — F43.21 GRIEF: Status: ACTIVE | Noted: 2024-06-04

## 2024-06-04 PROBLEM — D64.9 ANEMIA: Status: ACTIVE | Noted: 2024-06-04

## 2024-06-04 PROBLEM — R06.02 SHORTNESS OF BREATH: Status: ACTIVE | Noted: 2024-06-04

## 2024-06-04 PROBLEM — R91.1 LUNG NODULE SEEN ON IMAGING STUDY: Status: ACTIVE | Noted: 2024-06-04

## 2024-06-04 PROBLEM — E04.1 THYROID NODULE: Status: ACTIVE | Noted: 2024-06-04

## 2024-06-04 PROBLEM — C34.11 PRIMARY MALIGNANT NEOPLASM OF RIGHT UPPER LOBE OF LUNG (MULTI): Status: ACTIVE | Noted: 2024-06-04

## 2024-06-04 PROBLEM — M79.674 PAIN IN TOES OF BOTH FEET: Status: ACTIVE | Noted: 2024-06-04

## 2024-06-04 PROBLEM — Z95.9 CARDIAC DEVICE IN SITU: Status: ACTIVE | Noted: 2022-03-03

## 2024-06-04 PROBLEM — K59.00 CONSTIPATION: Status: ACTIVE | Noted: 2024-06-04

## 2024-06-04 PROBLEM — I99.8 ACUTE LOWER EXTREMITY ISCHEMIA: Status: ACTIVE | Noted: 2024-06-04

## 2024-06-04 PROBLEM — Z20.822 CLOSE EXPOSURE TO SEVERE ACUTE RESPIRATORY SYNDROME CORONAVIRUS 2 (SARS-COV-2): Status: ACTIVE | Noted: 2022-07-08

## 2024-06-04 PROBLEM — I70.202 FEMORAL ARTERY STENOSIS, LEFT (CMS-HCC): Status: ACTIVE | Noted: 2024-06-04

## 2024-06-04 PROBLEM — Z85.118 HISTORY OF LUNG CANCER: Status: ACTIVE | Noted: 2022-07-08

## 2024-06-04 PROBLEM — F17.200 CHAIN SMOKER: Status: ACTIVE | Noted: 2022-03-03

## 2024-06-04 PROBLEM — M25.579 PAIN IN ANKLE: Status: ACTIVE | Noted: 2024-06-04

## 2024-06-04 PROBLEM — F43.21 ADJUSTMENT DISORDER WITH DEPRESSED MOOD: Status: ACTIVE | Noted: 2024-06-04

## 2024-06-04 PROBLEM — M79.675 PAIN IN TOES OF BOTH FEET: Status: ACTIVE | Noted: 2024-06-04

## 2024-06-04 PROBLEM — G62.9 PERIPHERAL NEUROPATHY: Status: ACTIVE | Noted: 2024-06-04

## 2024-06-04 PROBLEM — K21.9 GASTROESOPHAGEAL REFLUX DISEASE: Status: ACTIVE | Noted: 2022-01-14

## 2024-06-04 PROBLEM — Z98.890 CRITICAL LIMB ISCHEMIA WITH HISTORY OF REVASCULARIZATION OF SAME EXTREMITY (MULTI): Status: ACTIVE | Noted: 2024-06-04

## 2024-06-04 PROBLEM — I20.9 ANGINA PECTORIS (CMS-HCC): Status: ACTIVE | Noted: 2024-06-04

## 2024-06-04 PROBLEM — C34.31 PRIMARY MALIGNANT NEOPLASM OF RIGHT LOWER LOBE OF LUNG (MULTI): Status: ACTIVE | Noted: 2024-06-04

## 2024-06-04 PROBLEM — E78.5 HYPERLIPIDEMIA, UNSPECIFIED: Status: ACTIVE | Noted: 2022-01-14

## 2024-06-04 PROBLEM — Z86.718 HISTORY OF THROMBOSIS: Status: ACTIVE | Noted: 2022-03-03

## 2024-06-04 PROBLEM — I70.213 INTERMITTENT CLAUDICATION OF BOTH LOWER EXTREMITIES DUE TO ATHEROSCLEROSIS (CMS-HCC): Status: ACTIVE | Noted: 2024-06-04

## 2024-06-04 PROBLEM — J06.9 URI WITH COUGH AND CONGESTION: Status: ACTIVE | Noted: 2024-06-04

## 2024-06-04 PROBLEM — Z86.19 HISTORY OF HEPATITIS C VIRUS INFECTION: Status: ACTIVE | Noted: 2022-07-08

## 2024-06-04 PROBLEM — I11.9 HYPERTENSIVE HEART DISEASE WITHOUT CONGESTIVE HEART FAILURE: Status: ACTIVE | Noted: 2024-06-04

## 2024-06-04 RX ORDER — BICTEGRAVIR SODIUM, EMTRICITABINE, AND TENOFOVIR ALAFENAMIDE FUMARATE 50; 200; 25 MG/1; MG/1; MG/1
1 TABLET ORAL DAILY
Qty: 30 TABLET | Refills: 5 | Status: SHIPPED | OUTPATIENT
Start: 2024-06-04

## 2024-06-04 RX ORDER — HYDROCHLOROTHIAZIDE 25 MG/1
25 TABLET ORAL DAILY
Qty: 30 TABLET | Refills: 3 | Status: SHIPPED | OUTPATIENT
Start: 2024-06-04

## 2024-06-05 ENCOUNTER — OFFICE VISIT (OUTPATIENT)
Dept: CARDIOLOGY | Facility: HOSPITAL | Age: 64
End: 2024-06-05
Payer: COMMERCIAL

## 2024-06-05 VITALS
SYSTOLIC BLOOD PRESSURE: 112 MMHG | WEIGHT: 110.6 LBS | HEIGHT: 66 IN | DIASTOLIC BLOOD PRESSURE: 73 MMHG | HEART RATE: 84 BPM | BODY MASS INDEX: 17.78 KG/M2 | OXYGEN SATURATION: 94 %

## 2024-06-05 DIAGNOSIS — I70.292: Primary | ICD-10-CM

## 2024-06-05 DIAGNOSIS — E78.5 HYPERLIPIDEMIA, UNSPECIFIED HYPERLIPIDEMIA TYPE: ICD-10-CM

## 2024-06-05 PROCEDURE — 1036F TOBACCO NON-USER: CPT | Performed by: INTERNAL MEDICINE

## 2024-06-05 PROCEDURE — 3074F SYST BP LT 130 MM HG: CPT | Performed by: INTERNAL MEDICINE

## 2024-06-05 PROCEDURE — 3078F DIAST BP <80 MM HG: CPT | Performed by: INTERNAL MEDICINE

## 2024-06-05 PROCEDURE — 99214 OFFICE O/P EST MOD 30 MIN: CPT | Performed by: INTERNAL MEDICINE

## 2024-06-05 PROCEDURE — 1126F AMNT PAIN NOTED NONE PRSNT: CPT | Performed by: INTERNAL MEDICINE

## 2024-06-05 PROCEDURE — 3008F BODY MASS INDEX DOCD: CPT | Performed by: INTERNAL MEDICINE

## 2024-06-05 PROCEDURE — 1159F MED LIST DOCD IN RCRD: CPT | Performed by: INTERNAL MEDICINE

## 2024-06-05 RX ORDER — ATORVASTATIN CALCIUM 80 MG/1
80 TABLET, FILM COATED ORAL DAILY
Qty: 30 TABLET | Refills: 11 | Status: SHIPPED | OUTPATIENT
Start: 2024-06-05 | End: 2025-06-05

## 2024-06-05 ASSESSMENT — PAIN SCALES - GENERAL: PAINLEVEL: 0-NO PAIN

## 2024-06-05 NOTE — PROGRESS NOTES
Chief complaint: PAD     Subjective   Patient has a known history of PAD since at least 2013  She has had multiple interventions done to the left leg for CLI with interventional cardiology and vascular surgery, last was in 2023  Then she was lost to follow-up as her mom got sick, today she is presenting and motivated to start following up again    She continues to be on her medications Plavix, Pletal and Xarelto 20 daily, tolerated well  She is also on Lipitor 40 daily, last LDL checked in 3/2024 is 115  Blood pressure is controlled  No known history of diabetes  She quit smoking in 2008  Body mass index is 17.85 kg/m².    She is trying to walk more, but she does not walk far enough to know there is claudication pain or no   No rest pain  No open wounds  No toe discoloration    Review of Systems  As noted above   Chronic intermittent LLE swelling  Chronic LLE below-knee numbness and sharp shooting pain  No bruises, melena or other obvious bleeding  No chest pain or shortness of breath  No lightheadedness or dizziness  No other complaints today     Past Medical History:   Diagnosis Date    Benign neoplasm, unspecified site 12/19/2017    Adenomatous polyps    Chronic viral hepatitis C (Multi) 03/17/2020    Chronic viral hepatitis C    Latent tuberculosis     Latent tuberculosis by skin test    Pain in right leg     Lower extremity pain, bilateral    Personal history of cervical dysplasia     History of cervical dysplasia    Personal history of other infectious and parasitic diseases     History of syphilis    Personal history of other mental and behavioral disorders     History of depression    Personal history of other specified conditions     History of insomnia     Past Surgical History:   Procedure Laterality Date    COLPOSCOPY  02/19/2014    Colposcopy    CT ANGIO CORONARY ART WITH HEARTFLOW IF SCORE >30%  12/20/2018    CT HEART CORONARY ANGIOGRAM 12/20/2018 [facility]    CT AORTA AND BILATERAL  ILIOFEMORAL RUNOFF ANGIOGRAM W AND/OR WO IV CONTRAST  2023    CT AORTA AND BILATERAL ILIOFEMORAL RUNOFF ANGIOGRAM W AND/OR WO IV CONTRAST [facility] CT    CT GUIDED PERCUTANEOUS BIOPSY LUNG  2021    CT GUIDED PERCUTANEOUS BIOPSY LUNG 2021 [facility] A[facility]GAC[facility]    OTHER SURGICAL HISTORY  2014    PTA Iliac Initial Stenosis With Stent    SPLENECTOMY, TOTAL  2014    Splenectomy     Social History     Socioeconomic History    Marital status: Single     Spouse name: None    Number of children: None    Years of education: None    Highest education level: None   Occupational History    None   Tobacco Use    Smoking status: Former     Current packs/day: 0.00     Types: Cigarettes     Quit date:      Years since quittin.4    Smokeless tobacco: Never   Substance and Sexual Activity    Alcohol use: Never    Drug use: Not Currently    Sexual activity: Not Currently   Other Topics Concern    None   Social History Narrative    None     Social Determinants of Health     Financial Resource Strain: Not on File (3/31/2021)    Received from [employer]     Financial Resource Strain     Financial Resource Strain: 0   Food Insecurity: Not on File (3/31/2021)    Received from [employer]     Food Insecurity     Food: 0   Transportation Needs: Not on File (3/31/2021)    Received from [employer]     Transportation Needs     Transportation: 0   Physical Activity: Not on File (3/31/2021)    Received from [employer]     Physical Activity     Physical Activity: 0   Stress: Not on File (3/31/2021)    Received from [employer]     Stress     Stress: 0   Social Connections: Not on File (3/31/2021)    Received from [employer]     Social Connections     Social Connections and Isolation: 0   Intimate Partner Violence: Not on file   Housing Stability: Not on File (3/31/2021)    Received from [employer]     Housing Stability     [place]ous[place]      Family History   Family history unknown: Yes      No Known Allergies    Objective   Physical Exam  /73 (BP Location:  "Left arm, Patient Position: Sitting)   Pulse 84   Ht 1.676 m (5' 6\")   Wt 50.2 kg (110 lb 9.6 oz)   BMI 17.85 kg/m²      General: In no acute distress  Neuro: alert and oriented x3  CV:  RRR  Lungs: CTA bilaterally  Abd:  Soft, non-tender   Psych:  Appropriate affect  Upper extremities: No swelling, +2 radial   Lower extremities: LLE edema-chronic, frantz +2 DP, difficult to palpate PT, feet warms to touch   Skin:  No open ulcers or chronic venous stasis changes     Medications   Current Outpatient Medications   Medication Instructions    atorvastatin (LIPITOR) 40 mg, oral, Daily    Biktarvy -25 mg tablet 1 tablet, oral, Daily    cilostazol (PLETAL) 100 mg, oral, 2 times daily    clopidogrel (PLAVIX) 75 mg, oral, Daily    gabapentin (NEURONTIN) 300 mg, oral, 3 times daily    hydroCHLOROthiazide (HYDRODIURIL) 25 mg, oral, Daily    lisinopril 40 mg, oral, Daily    Xarelto 20 mg, oral, Daily       Lab Review   Recent Labs     03/12/24  1603 06/26/23  0821 06/25/23  1731 06/25/23  0803 06/25/23  0602 06/24/23  0830 05/30/23  1237 05/02/23  0428    139 135* 137 136 141 141 139   K 4.6 4.1 4.1 3.8 3.7 4.4 5.0 4.1    101 99 101 101 105 106 107   CO2 28 29 28 29 28 28 27 25   ANIONGAP 14 13 12 11 11 12 13 11   BUN 15 10 12 11 10 10 14 6   CREATININE 0.89 0.80 0.86 0.84 0.80 0.77 1.12* 0.83   EGFR 72  --   --   --   --   --   --   --    MG  --  2.02 2.75* 1.75 1.75  --   --   --      Recent Labs     03/12/24  1603 06/26/23  0821 06/25/23  1731 06/25/23  0803 06/25/23  0602 06/24/23  0830 05/30/23  1237 05/02/23  0428 05/01/23  0804 04/30/23  0934 04/22/23 1938 07/02/22  1624   ALBUMIN 4.5 3.1* 3.3* 3.3* 3.2*   < > 4.3   < > 3.8   < > 4.2 4.1   ALKPHOS 95  --   --   --   --   --  88  --  65  --  79 93   ALT 6*  --   --   --   --   --  5*  --  10  --  4* 5*   AST 13  --   --   --   --   --  13  --  17  --  12 11   BILITOT 0.3  --   --   --   --   --  0.4  --  0.4  --  0.3 0.3    < > = values in this " "interval not displayed.     Recent Labs     03/12/24  1603 06/26/23  0821 06/25/23  1731 06/25/23  0803 06/25/23  0602 06/24/23  0830 05/30/23  1237 05/02/23  0428   WBC 5.9 10.6 13.2* 13.6* 12.8* 11.0 5.5 4.5   HGB 13.3 9.0* 9.9* 9.5* 9.6* 10.5* 14.3 11.2*   HCT 42.5 27.4* 30.1* 28.0* 29.6* 31.8* 43.8 33.5*    308 320 314 313 301 366 213   MCV 94 100 99 98 99 101* 99 94     Recent Labs     06/25/23  0805 06/25/23  0216 06/25/23  0211 06/24/23  1956 05/02/23  1303 05/02/23  0429 05/01/23  0827 04/29/23  0927 04/23/23  1639 04/22/23  1938 07/03/22  0616 07/02/22  1624 10/10/21  0944 08/13/21  0847 05/21/21  0127 05/14/21  1400 12/20/18  1220   INR  --   --   --   --   --   --   --   --   --  1.1  --  2.2* 1.1 1.0  --  1.4* 1.0   HAUF CANCELED 0.6 0.4 0.2  0.2 0.6 0.1 0.4 0.5   < >  --    < >  --   --   --    < >  --   --     < > = values in this interval not displayed.     PTT - 6/24/2023:  3:11 PM    Recent Labs     03/12/24  1603 05/30/23  1237 07/04/22  0647 05/27/21  1536   CHOL 211* 275* 181 138   LDLF  --  175* 107* 69   HDL 71.3 76.5 60.3 47.2   TRIG 125 118 70 111     No results found for: \"HGBA1C\"  Lab Results   Component Value Date    TSH 0.38 (L) 05/30/2023     Imaging  ABEL 7/31/23  Right Lower PVR: Evidence of mild arterial occlusive disease in the right lower extremity at rest. Decreased digital perfusion noted. Triphasic flow is noted in the right common femoral artery, right posterior tibial artery and right dorsalis pedis artery.  Left Lower PVR: Evidence of mild arterial occlusive disease in the left lower extremity at rest. Normal digital perfusion noted. Biphasic flow is noted in the left common femoral artery, left posterior tibial artery and left dorsalis pedis artery.    Assessment/Plan   Rosenda Anderson is a 63 y.o. female with PMHx HTN, HLD, HIV, Hep C, splenectomy, NSCLC dx in 2021 s/p RTX, thyroid nodules and Ex-smoker     PAD   _ Known history of PAD since at least 2013  _ Multiple " episodes of LLE CLI requiring multiple interventions   _ 5/2021 left distal SFA/pop stent, with evidence of stent occlusion s/p re-intervention on, dc on ASA + xarelto 2.5 BID, with multiple interventions in [id number], and her medications were changed to Xarelto 20 daily and Plavix. Again evidence of occlusion with distal reconstitution noted on CTA s/p left femoral-pop bypass grafting on 6/23/2023 by Dr. Mccloud  She has been on Xarelto 20 qd, Plavix & Pletal since  _ ASO Rfs: HTN, HLD, Ex-smoker (quit in 20008)    Plan:  -- Monitor for the signs/symptoms that require medical attention  -- Continue Xarelto 20 qd, Plavix & Pletal  -- Risk factors modifications   Target BP <130/80  Increased high-intensity statins to Lipitor 80 mg/day. Target LDL <70. Repeat Lipid panel next visit (pt updated)   Optimize BG control and continue to avoid smoking. Continue follow up with PCP  -- Continue following with Dr. Mccloud for graft surveillance   -- Rest as detailed in the patient's instructions    Noemi Gallego MD

## 2024-06-05 NOTE — PATIENT INSTRUCTIONS
-- Please schedule your ultrasound  -- Continue taking your medications  -- Continue tobacco cessation and avoid 2nd hand smoke  -- Exercise as tolerated, ideally walking with goal 30 minutes for minimum 5 days/week  -- Heart healthy diet highly recommended  -- Follow-up in 6 months, earlier if needed

## 2024-06-20 NOTE — PROGRESS NOTES
History of Present Illness    Rosenda Anderson is a 63 y.o. female who is seen at the request of [facility] for the management of thyroid nodules.  The patient had an ultrasound done in April 2024 that shows of multiple nodules none of which is large enough to be worried about malignancy or to warrant a biopsy.  The patient has no family history of thyroid cancer.      Past Medical History    The past medical history and review the system is significant for HIV, lung cancer, high blood pressure.  Her medications are documented in the chart.  She does not have any known allergies to medicines.  She has stopped smoking 9 years ago.  She also has stopped drinking many years ago.  She is here alone today.    Physical Exam    The patient is alert and oriented. Examination of the external ears, ear canals, and eardrums, is within normal limits. Examination of the anterior and external nose is negative. Examination of the oral cavity and oropharynx is normal. There is no evidence of any mucosal lesions. There is good mobility of the tongue and palate. There is good mandibular excursion. Palpation of the parotid, neck, and thyroid field shows bilateral hypertrophy of the parotid glands.  It also shows some small nodularity on the thyroid especially on the left.  I cannot appreciate any worrisome adenopathies.    A flexible laryngoscopy was carried out. Under topical Xylocaine and Prabhjot-Synephrine the scope was introduced through the nostril. The nasopharynx, base of tongue, hypopharynx, and larynx are visualized.  The vocal cords are normally mobile. There is no pooling of secretions in the piriform sinuses. There is no evidence of any mucosal lesions.    Assessment and Plan    Multiple thyroid nodules.  None of which at this point requires a biopsy.  A TSH level will be obtained today.  Repeat ultrasound should be done in 1 year.  The patient is to call my office ahead of time to have this scheduled.    I will see her in 1  year.

## 2024-06-21 ENCOUNTER — OFFICE VISIT (OUTPATIENT)
Dept: OTOLARYNGOLOGY | Facility: HOSPITAL | Age: 64
End: 2024-06-21
Payer: COMMERCIAL

## 2024-06-21 ENCOUNTER — LAB (OUTPATIENT)
Dept: LAB | Facility: HOSPITAL | Age: 64
End: 2024-06-21
Payer: COMMERCIAL

## 2024-06-21 ENCOUNTER — HOSPITAL ENCOUNTER (OUTPATIENT)
Dept: VASCULAR MEDICINE | Facility: HOSPITAL | Age: 64
Discharge: HOME | End: 2024-06-21
Payer: COMMERCIAL

## 2024-06-21 VITALS — HEIGHT: 66 IN | BODY MASS INDEX: 17.36 KG/M2 | TEMPERATURE: 97.3 F | WEIGHT: 108 LBS

## 2024-06-21 DIAGNOSIS — E04.1 THYROID NODULE: ICD-10-CM

## 2024-06-21 DIAGNOSIS — C34.91 NON-SMALL CELL CANCER OF RIGHT LUNG (MULTI): ICD-10-CM

## 2024-06-21 DIAGNOSIS — I70.292: ICD-10-CM

## 2024-06-21 DIAGNOSIS — I73.9 PERIPHERAL VASCULAR DISEASE, UNSPECIFIED (CMS-HCC): ICD-10-CM

## 2024-06-21 LAB — TSH SERPL-ACNC: 0.64 MIU/L (ref 0.44–3.98)

## 2024-06-21 PROCEDURE — 99213 OFFICE O/P EST LOW 20 MIN: CPT | Mod: 25 | Performed by: OTOLARYNGOLOGY

## 2024-06-21 PROCEDURE — 99203 OFFICE O/P NEW LOW 30 MIN: CPT | Performed by: OTOLARYNGOLOGY

## 2024-06-21 PROCEDURE — 31575 DIAGNOSTIC LARYNGOSCOPY: CPT | Performed by: OTOLARYNGOLOGY

## 2024-06-21 PROCEDURE — 36415 COLL VENOUS BLD VENIPUNCTURE: CPT

## 2024-06-21 PROCEDURE — 3008F BODY MASS INDEX DOCD: CPT | Performed by: OTOLARYNGOLOGY

## 2024-06-21 PROCEDURE — 93924 LWR XTR VASC STDY BILAT: CPT | Performed by: INTERNAL MEDICINE

## 2024-06-21 PROCEDURE — 1036F TOBACCO NON-USER: CPT | Performed by: OTOLARYNGOLOGY

## 2024-06-21 PROCEDURE — 1160F RVW MEDS BY RX/DR IN RCRD: CPT | Performed by: OTOLARYNGOLOGY

## 2024-06-21 PROCEDURE — 1159F MED LIST DOCD IN RCRD: CPT | Performed by: OTOLARYNGOLOGY

## 2024-06-21 PROCEDURE — 93924 LWR XTR VASC STDY BILAT: CPT

## 2024-06-21 PROCEDURE — 84443 ASSAY THYROID STIM HORMONE: CPT

## 2024-06-21 ASSESSMENT — PATIENT HEALTH QUESTIONNAIRE - PHQ9
1. LITTLE INTEREST OR PLEASURE IN DOING THINGS: NOT AT ALL
10. IF YOU CHECKED OFF ANY PROBLEMS, HOW DIFFICULT HAVE THESE PROBLEMS MADE IT FOR YOU TO DO YOUR WORK, TAKE CARE OF THINGS AT HOME, OR GET ALONG WITH OTHER PEOPLE: NOT DIFFICULT AT ALL
2. FEELING DOWN, DEPRESSED OR HOPELESS: SEVERAL DAYS
SUM OF ALL RESPONSES TO PHQ9 QUESTIONS 1 & 2: 1

## 2024-06-24 ENCOUNTER — TELEPHONE (OUTPATIENT)
Dept: CARDIOLOGY | Facility: CLINIC | Age: 64
End: 2024-06-24
Payer: COMMERCIAL

## 2024-06-24 NOTE — TELEPHONE ENCOUNTER
Result Communication    Resulted Orders   Vascular US ankle brachial index (ABEL) with exercise    Narrative                [place] [address]    Tel [phone] and Fax [phone]       Vascular Lab Report  VASC US PVR WITH EXERCISE       Patient Name:      PILO MOSES       Reading Physician:  [id number] Israel Ortiz MD, RPVI  Study Date:        6/21/2024            Ordering Physician: [id number] DWAYNE ALVA  MRN/PID:           [id number]             Technologist:       Renee Reese RVT  Accession#:        [id number]         Technologist 2:  Date of Birth/Age: 1960 / 63 years Encounter#:         [id number]  Gender:            F  Admission Status:  Outpatient           Location Performed: [facility]       Diagnosis/ICD: Peripheral vascular disease, unspecified-I73.9  Indication:    Claudication  CPT Codes:     11892 Peripheral artery PVR with exercise       Patient History Hx of LLE fem-pop bypass.       CONCLUSIONS:  Right Lower PVR: No evidence of arterial occlusive disease in the right lower extremity at rest and with exercise. Multiphasic flow is noted in the right common femoral artery, right posterior tibial artery and right dorsalis pedis artery.  Left Lower PVR: No evidence of arterial occlusive disease in the left lower extremity at rest and with exercise. Multiphasic flow is noted in the left common femoral artery, left posterior tibial artery and left dorsalis pedis artery.  Exercise:Exercise completed at 1.5 mph and 0% grade for 5 minutes.     Comparison:  Compared with study from 7/31/2023, previous exam was not performed with exercise. In today's exam, bilateral legs appear normal pre and post exercise.     Imaging & Doppler Findings:     RIGHT Lower PVR                Pressures Ratios  Right Posterior Tibial  (Ankle) 128 mmHg  0.91  Right Dorsalis Pedis (Ankle)   147 mmHg  1.04  Right Digit (Great Toe)        95 mmHg   0.67     Right Ankle Post Exercise      126 mmHg  0.94       LEFT Lower PVR                Pressures Ratios  Left Posterior Tibial (Ankle) 139 mmHg  0.99  Left Dorsalis Pedis (Ankle)   141 mmHg  1.00  Left Digit (Great Toe)        110 mmHg  0.78     Left Ankle Post Exercise      126 mmHg  0.94                       Right     Left  Brachial Pressure 141 mmHg 135 mmHg          [id number] Israel Ortiz MD, RPVI  Electronically signed by [id number] Israel Ortiz MD, RPVI on 6/21/2024 at 3:42:34 PM         ** Final **         9:52 AM      Results were successfully communicated with the patient and they acknowledged their understanding.

## 2024-06-24 NOTE — TELEPHONE ENCOUNTER
----- Message from Noemi Gallego MD sent at 6/24/2024  7:27 AM EDT -----  US done showed adequate blood flow to the legs     Thanks,   Noemi Gallego MD

## 2024-07-01 DIAGNOSIS — Z95.820 PERIPHERAL VASCULAR ANGIOPLASTY STATUS WITH IMPLANTS AND GRAFTS: ICD-10-CM

## 2024-07-03 RX ORDER — CLOPIDOGREL BISULFATE 75 MG/1
75 TABLET ORAL DAILY
Qty: 30 TABLET | Refills: 0 | Status: SHIPPED | OUTPATIENT
Start: 2024-07-03

## 2024-07-03 RX ORDER — RIVAROXABAN 20 MG/1
20 TABLET, FILM COATED ORAL DAILY
Qty: 30 TABLET | Refills: 0 | Status: SHIPPED | OUTPATIENT
Start: 2024-07-03

## 2024-07-03 RX ORDER — CILOSTAZOL 100 MG/1
100 TABLET ORAL 2 TIMES DAILY
Qty: 60 TABLET | Refills: 7 | Status: SHIPPED | OUTPATIENT
Start: 2024-07-03

## 2024-08-19 DIAGNOSIS — Z95.820 PERIPHERAL VASCULAR ANGIOPLASTY STATUS WITH IMPLANTS AND GRAFTS: ICD-10-CM

## 2024-08-20 RX ORDER — CLOPIDOGREL BISULFATE 75 MG/1
75 TABLET ORAL DAILY
Qty: 30 TABLET | Refills: 0 | Status: SHIPPED | OUTPATIENT
Start: 2024-08-20

## 2024-08-20 RX ORDER — RIVAROXABAN 20 MG/1
20 TABLET, FILM COATED ORAL DAILY
Qty: 30 TABLET | Refills: 0 | Status: SHIPPED | OUTPATIENT
Start: 2024-08-20

## 2024-09-19 DIAGNOSIS — I10 HYPERTENSION, UNSPECIFIED TYPE: ICD-10-CM

## 2024-09-19 DIAGNOSIS — Z95.820 PERIPHERAL VASCULAR ANGIOPLASTY STATUS WITH IMPLANTS AND GRAFTS: ICD-10-CM

## 2024-09-19 RX ORDER — LISINOPRIL 40 MG/1
40 TABLET ORAL DAILY
Qty: 30 TABLET | Refills: 3 | Status: SHIPPED | OUTPATIENT
Start: 2024-09-19

## 2024-10-02 ENCOUNTER — TELEPHONE (OUTPATIENT)
Dept: RADIATION ONCOLOGY | Facility: HOSPITAL | Age: 64
End: 2024-10-02
Payer: COMMERCIAL

## 2024-10-02 DIAGNOSIS — C34.91 MALIGNANT NEOPLASM OF UNSPECIFIED PART OF RIGHT BRONCHUS OR LUNG (MULTI): Primary | ICD-10-CM

## 2024-10-03 ENCOUNTER — HOSPITAL ENCOUNTER (OUTPATIENT)
Dept: RADIATION ONCOLOGY | Facility: HOSPITAL | Age: 64
Setting detail: RADIATION/ONCOLOGY SERIES
Discharge: HOME | End: 2024-10-03
Payer: COMMERCIAL

## 2024-10-03 ENCOUNTER — LAB (OUTPATIENT)
Dept: LAB | Facility: HOSPITAL | Age: 64
End: 2024-10-03
Payer: COMMERCIAL

## 2024-10-03 ENCOUNTER — HOSPITAL ENCOUNTER (OUTPATIENT)
Dept: RADIOLOGY | Facility: HOSPITAL | Age: 64
Discharge: HOME | End: 2024-10-03
Payer: COMMERCIAL

## 2024-10-03 VITALS
HEART RATE: 77 BPM | BODY MASS INDEX: 18.29 KG/M2 | TEMPERATURE: 95.2 F | OXYGEN SATURATION: 96 % | DIASTOLIC BLOOD PRESSURE: 70 MMHG | WEIGHT: 113.3 LBS | RESPIRATION RATE: 18 BRPM | SYSTOLIC BLOOD PRESSURE: 113 MMHG

## 2024-10-03 DIAGNOSIS — C34.91 NON-SMALL CELL CANCER OF RIGHT LUNG (MULTI): ICD-10-CM

## 2024-10-03 DIAGNOSIS — B20 HIV DISEASE (MULTI): ICD-10-CM

## 2024-10-03 DIAGNOSIS — C34.91 MALIGNANT NEOPLASM OF UNSPECIFIED PART OF RIGHT BRONCHUS OR LUNG (MULTI): ICD-10-CM

## 2024-10-03 DIAGNOSIS — C34.91 NON-SMALL CELL CANCER OF RIGHT LUNG (MULTI): Primary | ICD-10-CM

## 2024-10-03 LAB
ALBUMIN SERPL BCP-MCNC: 4.2 G/DL (ref 3.4–5)
ALP SERPL-CCNC: 84 U/L (ref 33–136)
ALT SERPL W P-5'-P-CCNC: 9 U/L (ref 7–45)
ANION GAP SERPL CALC-SCNC: 14 MMOL/L (ref 10–20)
AST SERPL W P-5'-P-CCNC: 14 U/L (ref 9–39)
BASOPHILS # BLD AUTO: 0.07 X10*3/UL (ref 0–0.1)
BASOPHILS NFR BLD AUTO: 1.1 %
BILIRUB SERPL-MCNC: 0.3 MG/DL (ref 0–1.2)
BUN SERPL-MCNC: 12 MG/DL (ref 6–23)
CALCIUM SERPL-MCNC: 9.2 MG/DL (ref 8.6–10.3)
CHLORIDE SERPL-SCNC: 102 MMOL/L (ref 98–107)
CO2 SERPL-SCNC: 30 MMOL/L (ref 21–32)
CREAT SERPL-MCNC: 0.9 MG/DL (ref 0.5–1.05)
EGFRCR SERPLBLD CKD-EPI 2021: 71 ML/MIN/1.73M*2
EOSINOPHIL # BLD AUTO: 0.17 X10*3/UL (ref 0–0.7)
EOSINOPHIL NFR BLD AUTO: 2.6 %
ERYTHROCYTE [DISTWIDTH] IN BLOOD BY AUTOMATED COUNT: 13.2 % (ref 11.5–14.5)
GLUCOSE SERPL-MCNC: 90 MG/DL (ref 74–99)
HCT VFR BLD AUTO: 43.5 % (ref 36–46)
HGB BLD-MCNC: 13.9 G/DL (ref 12–16)
IMM GRANULOCYTES # BLD AUTO: 0.02 X10*3/UL (ref 0–0.7)
IMM GRANULOCYTES NFR BLD AUTO: 0.3 % (ref 0–0.9)
LYMPHOCYTES # BLD AUTO: 2.07 X10*3/UL (ref 1.2–4.8)
LYMPHOCYTES NFR BLD AUTO: 31.2 %
MCH RBC QN AUTO: 31.2 PG (ref 26–34)
MCHC RBC AUTO-ENTMCNC: 32 G/DL (ref 32–36)
MCV RBC AUTO: 98 FL (ref 80–100)
MONOCYTES # BLD AUTO: 0.64 X10*3/UL (ref 0.1–1)
MONOCYTES NFR BLD AUTO: 9.7 %
NEUTROPHILS # BLD AUTO: 3.66 X10*3/UL (ref 1.2–7.7)
NEUTROPHILS NFR BLD AUTO: 55.1 %
NRBC BLD-RTO: 0 /100 WBCS (ref 0–0)
PLATELET # BLD AUTO: 311 X10*3/UL (ref 150–450)
POTASSIUM SERPL-SCNC: 3.7 MMOL/L (ref 3.5–5.3)
PROT SERPL-MCNC: 7.7 G/DL (ref 6.4–8.2)
RBC # BLD AUTO: 4.46 X10*6/UL (ref 4–5.2)
SODIUM SERPL-SCNC: 142 MMOL/L (ref 136–145)
WBC # BLD AUTO: 6.6 X10*3/UL (ref 4.4–11.3)

## 2024-10-03 PROCEDURE — 71260 CT THORAX DX C+: CPT

## 2024-10-03 PROCEDURE — 36415 COLL VENOUS BLD VENIPUNCTURE: CPT

## 2024-10-03 PROCEDURE — 85025 COMPLETE CBC W/AUTO DIFF WBC: CPT

## 2024-10-03 PROCEDURE — 2550000001 HC RX 255 CONTRASTS: Mod: SE | Performed by: NURSE PRACTITIONER

## 2024-10-03 PROCEDURE — 99214 OFFICE O/P EST MOD 30 MIN: CPT | Performed by: NURSE PRACTITIONER

## 2024-10-03 PROCEDURE — 87536 HIV-1 QUANT&REVRSE TRNSCRPJ: CPT

## 2024-10-03 PROCEDURE — 84075 ASSAY ALKALINE PHOSPHATASE: CPT

## 2024-10-03 ASSESSMENT — ENCOUNTER SYMPTOMS
DIAPHORESIS: 0
MUSCULOSKELETAL NEGATIVE: 1
CHEST TIGHTNESS: 0
CHOKING: 0
CHILLS: 0
UNEXPECTED WEIGHT CHANGE: 0
FEVER: 0
SHORTNESS OF BREATH: 1
NEUROLOGICAL NEGATIVE: 1
FATIGUE: 0
CARDIOVASCULAR NEGATIVE: 1
GASTROINTESTINAL NEGATIVE: 1
PSYCHIATRIC NEGATIVE: 1
APPETITE CHANGE: 0
HEMATOLOGIC/LYMPHATIC NEGATIVE: 1
COUGH: 1
DEPRESSION: 1
LOSS OF SENSATION IN FEET: 0
ACTIVITY CHANGE: 0
OCCASIONAL FEELINGS OF UNSTEADINESS: 0
WHEEZING: 0
APNEA: 0
STRIDOR: 0

## 2024-10-03 ASSESSMENT — PATIENT HEALTH QUESTIONNAIRE - PHQ9
2. FEELING DOWN, DEPRESSED OR HOPELESS: MORE THAN HALF THE DAYS
SUM OF ALL RESPONSES TO PHQ9 QUESTIONS 1 AND 2: 2
1. LITTLE INTEREST OR PLEASURE IN DOING THINGS: NOT AT ALL
10. IF YOU CHECKED OFF ANY PROBLEMS, HOW DIFFICULT HAVE THESE PROBLEMS MADE IT FOR YOU TO DO YOUR WORK, TAKE CARE OF THINGS AT HOME, OR GET ALONG WITH OTHER PEOPLE: NOT DIFFICULT AT ALL

## 2024-10-03 ASSESSMENT — PAIN SCALES - GENERAL: PAINLEVEL: 0-NO PAIN

## 2024-10-03 NOTE — PROGRESS NOTES
Patient ID: [id number]     Cancer Staging:   Treatment Synopsis:    64-year-old patient who had a routine CT scan of the chest done on April 22, 2021 which showed a spiculated nodule measuring 1.8 cm which in the right upper for lung.   There were multiple other nonspecific pulmonary nodules including a 5 mm nodule within the left upper lobe a 4 mm nodule within the right lower lobe and when these images are compared to imaging on a coronary artery CT angiogram of December 20, 2018  and most of the nodules are chronic. She was sent for a PET scan on June 2, 2021 showing hypermetabolic activity  within the right upper lobe lesion with a maximum SUV of 11.1.  There was some nonspecific mild subcentimeter bilateral axillary lymphadenopathy.  As she was undergoing these process she was admitted on May 20, 2021 for a limb ischemia. She had stopped  her aspirin and Plavix a week before the biopsy and she developed an occluded stent on the left side.  This required intervention for acute arterial obstruction on the left side with the revascularization.  She is was put back on anticoagulation.     DIAGNOSIS     Poorly differentiated adenocarcinoma of the lung.  Date of diagnosis is May 17, 2021 from a right upper lobe CT-guided core biopsy.  Immunohistochemistry positive for TTF-1 and negative for P 40.     Unable to have surgery due to not being able to hold anticoagulation.     SBRT completed on 8/31/21.       History of presenting illness    Rosenda Anderson is a 64 y.o. female who presents today for follow up 3 years and 2 months s/p SBRT to the right lung. Overall, patient feels good. Energy is baseline. It varies day to day. She has been going through a lot at home, losing her mother and aunt a week apart earlier this year.  Appetite good. Weight stable.  Endorses non productive cough and SOB with stairs. Feels the cough has improved some. Denies chest pain,  back pain or fevers. No oxygen or inhaler requirements.  Smokes marijuana daily. No neurological complaints. Referred patient to Dr. Panchal to evaluate thyroid nodules. She saw him in June 2024. No biopsies required. Follow up in 1 year with ultrasound.       Review of systems:  Review of Systems   Constitutional:  Negative for activity change, appetite change, chills, diaphoresis, fatigue, fever and unexpected weight change.   HENT: Negative.     Respiratory:  Positive for cough and shortness of breath. Negative for apnea, choking, chest tightness, wheezing and stridor.    Cardiovascular: Negative.    Gastrointestinal: Negative.    Musculoskeletal: Negative.    Skin: Negative.    Neurological: Negative.    Hematological: Negative.    Psychiatric/Behavioral: Negative.         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        Last recorded vital:  /70   Pulse 77   Temp 35.1 °C (95.2 °F) (Skin)   Resp 18   Wt 51.4 kg (113 lb 4.8 oz)   LMP  (LMP Unknown)   SpO2 96%   BMI 18.29 kg/m²     Physical exam  Physical Exam  Constitutional:       General: She is not in acute distress.     Appearance: Normal appearance. She is not ill-appearing, toxic-appearing or diaphoretic.   HENT:      Head: Normocephalic.   Cardiovascular:      Rate and Rhythm: Normal rate and regular rhythm.      Pulses: Normal pulses.      Heart sounds: Normal heart sounds.   Pulmonary:      Effort: Pulmonary effort is normal. No respiratory distress.      Breath sounds: Normal breath sounds. No stridor. No wheezing, rhonchi or rales.   Abdominal:      General: Bowel sounds are normal. There is no distension.      Palpations: Abdomen is soft. There is no mass.      Tenderness:  There is no abdominal tenderness. There is no guarding or rebound.      Hernia: No hernia is present.   Musculoskeletal:         General: Normal range of motion.      Cervical back: Normal range of motion and neck supple.   Skin:     General: Skin is warm and dry.   Neurological:      General: No focal deficit present.      Mental Status: She is alert and oriented to person, place, and time.   Psychiatric:         Mood and Affect: Mood normal.         Behavior: Behavior normal.         Thought Content: Thought content normal.         Judgment: Judgment normal.           Radiology results:  CT chest w IV contrast    Result Date: 10/3/2024  Interpreted By:  Art Heredia, STUDY: CT CHEST W IV CONTRAST;  10/3/2024 11:15 am   INDICATION: Signs/Symptoms:history of lung cancer s/p RT.   ,C34.91 Malignant neoplasm of unspecified part of right bronchus or lung (Multi)   COMPARISON: 04/03/2024   ACCESSION NUMBER(S): [id number]   ORDERING CLINICIAN: MARI JULIEN   TECHNIQUE: Helical data acquisition of the chest was obtained following intravenous administration of 60 cc of Omnipaque 350.. Images were reformatted in axial, coronal, and sagittal planes.   FINDINGS: LUNGS AND AIRWAYS: The trachea and central airways are patent. No endobronchial lesion is seen.   There is severe emphysematous changes. The patient is status post right upper lobe wedge resection. There is stable right upper lobe radiation changes with parenchymal atelectasis and nodular thickening. There is a 4 mm posterior segment left upper lobe parenchymal lung nodule. Multiple calcific granulomas.     MEDIASTINUM AND LIZZIE, LOWER NECK AND AXILLA: The visualized thyroid gland is within normal limits. No evidence of thoracic lymphadenopathy by CT criteria. Esophagus appears within normal limits as seen.   HEART AND VESSELS: The thoracic aorta normal in course and caliber. Main pulmonary artery and its branches are normal in caliber. There are severe  coronary artery calcifications. The cardiac chambers are not enlarged. There is no pericardial effusion seen.   UPPER ABDOMEN: There are subcentimeter hyperenhancing foci within segment 2 of the liver. The patient is status post cholecystectomy.Left upper quadrant surgical clips.     CHEST WALL AND OSSEOUS STRUCTURES: Chest wall is within normal limits. No acute osseous pathology.There are no suspicious osseous lesions.       1.  There are stable post wedge resection and radiation therapy changes overlying the right upper lobe. 2. Stable 4 mm left upper lobe parenchymal lung nodule. No suspicious parenchymal lung nodules. 3. Severe coronary artery calcifications and correlate with coronary artery disease risk factors.   Signed by: Art Heredia 10/3/2024 12:13 PM Dictation workstation:   [id number]      Plan:  Assessment/Plan     63 year old female 3 years and 2 months s/p SBRT to the right lung. Patient doing well. No acute complaints related to radiation. CT scan from today shows stable post wedge resection and radiation therapy changes overlying the right upper lobe. 2. Stable 4 mm left upper lobe parenchymal lung nodule. No suspicious parenchymal lung nodules..Continue follow up with Dr. Panchal as scheduled.   Patient to return to clinic in 6 months with same day CT of the chest unless needs to be seen sooner.   Instructed to call with any questions or concerns.

## 2024-10-04 LAB
HIV1 RNA # PLAS NAA DL=20: 68 COPIES/ML
HIV1 RNA # PLAS NAA DL=20: DETECTED {COPIES}/ML
HIV1 RNA SPEC NAA+PROBE-LOG#: 1.83 LOG10 CPY/ML

## 2024-10-07 ENCOUNTER — DOCUMENTATION (OUTPATIENT)
Dept: VASCULAR SURGERY | Facility: HOSPITAL | Age: 64
End: 2024-10-07
Payer: COMMERCIAL

## 2024-10-07 RX ORDER — CLOPIDOGREL BISULFATE 75 MG/1
75 TABLET ORAL DAILY
Qty: 30 TABLET | Refills: 0 | Status: SHIPPED | OUTPATIENT
Start: 2024-10-07

## 2024-10-07 RX ORDER — RIVAROXABAN 20 MG/1
20 TABLET, FILM COATED ORAL DAILY
Qty: 30 TABLET | Refills: 0 | Status: SHIPPED | OUTPATIENT
Start: 2024-10-07

## 2024-10-09 DIAGNOSIS — C34.91 NON-SMALL CELL CANCER OF RIGHT LUNG (MULTI): Primary | ICD-10-CM

## 2024-10-11 ENCOUNTER — APPOINTMENT (OUTPATIENT)
Dept: RADIOLOGY | Facility: HOSPITAL | Age: 64
End: 2024-10-11
Payer: COMMERCIAL

## 2024-10-23 ENCOUNTER — TELEPHONE (OUTPATIENT)
Dept: IMMUNOLOGY | Facility: CLINIC | Age: 64
End: 2024-10-23
Payer: COMMERCIAL

## 2024-10-24 ENCOUNTER — OFFICE VISIT (OUTPATIENT)
Dept: IMMUNOLOGY | Facility: CLINIC | Age: 64
End: 2024-10-24
Payer: COMMERCIAL

## 2024-10-24 VITALS
OXYGEN SATURATION: 97 % | HEART RATE: 83 BPM | DIASTOLIC BLOOD PRESSURE: 68 MMHG | RESPIRATION RATE: 18 BRPM | WEIGHT: 111.2 LBS | BODY MASS INDEX: 17.95 KG/M2 | SYSTOLIC BLOOD PRESSURE: 109 MMHG

## 2024-10-24 DIAGNOSIS — Z85.118 HISTORY OF LUNG CANCER: ICD-10-CM

## 2024-10-24 DIAGNOSIS — C34.11 PRIMARY MALIGNANT NEOPLASM OF RIGHT UPPER LOBE OF LUNG (MULTI): ICD-10-CM

## 2024-10-24 DIAGNOSIS — E78.5 HYPERLIPIDEMIA, UNSPECIFIED HYPERLIPIDEMIA TYPE: ICD-10-CM

## 2024-10-24 DIAGNOSIS — B20 HIV DISEASE (MULTI): ICD-10-CM

## 2024-10-24 DIAGNOSIS — I10 HYPERTENSION, UNSPECIFIED TYPE: Primary | ICD-10-CM

## 2024-10-24 DIAGNOSIS — I73.9 PERIPHERAL VASCULAR DISEASE (CMS-HCC): ICD-10-CM

## 2024-10-24 DIAGNOSIS — Z00.00 ROUTINE ADULT HEALTH MAINTENANCE: ICD-10-CM

## 2024-10-24 DIAGNOSIS — Z23 NEED FOR VACCINATION: ICD-10-CM

## 2024-10-24 LAB
BASOPHILS # BLD AUTO: 0.06 X10*3/UL (ref 0–0.1)
BASOPHILS NFR BLD AUTO: 1 %
CHOLEST SERPL-MCNC: 196 MG/DL (ref 0–199)
CHOLESTEROL/HDL RATIO: 2.7
EOSINOPHIL # BLD AUTO: 0.16 X10*3/UL (ref 0–0.7)
EOSINOPHIL NFR BLD AUTO: 2.6 %
ERYTHROCYTE [DISTWIDTH] IN BLOOD BY AUTOMATED COUNT: 13.2 % (ref 11.5–14.5)
HCT VFR BLD AUTO: 42.6 % (ref 36–46)
HDLC SERPL-MCNC: 72.7 MG/DL
HGB BLD-MCNC: 13.7 G/DL (ref 12–16)
IMM GRANULOCYTES # BLD AUTO: 0.02 X10*3/UL (ref 0–0.7)
IMM GRANULOCYTES NFR BLD AUTO: 0.3 % (ref 0–0.9)
LDLC SERPL CALC-MCNC: 104 MG/DL
LYMPHOCYTES # BLD AUTO: 2.13 X10*3/UL (ref 1.2–4.8)
LYMPHOCYTES NFR BLD AUTO: 34.6 %
MCH RBC QN AUTO: 31.2 PG (ref 26–34)
MCHC RBC AUTO-ENTMCNC: 32.2 G/DL (ref 32–36)
MCV RBC AUTO: 97 FL (ref 80–100)
MONOCYTES # BLD AUTO: 0.64 X10*3/UL (ref 0.1–1)
MONOCYTES NFR BLD AUTO: 10.4 %
NEUTROPHILS # BLD AUTO: 3.14 X10*3/UL (ref 1.2–7.7)
NEUTROPHILS NFR BLD AUTO: 51.1 %
NON HDL CHOLESTEROL: 123 MG/DL (ref 0–149)
NRBC BLD-RTO: 0 /100 WBCS (ref 0–0)
PLATELET # BLD AUTO: 328 X10*3/UL (ref 150–450)
RBC # BLD AUTO: 4.39 X10*6/UL (ref 4–5.2)
TRIGL SERPL-MCNC: 96 MG/DL (ref 0–149)
VLDL: 19 MG/DL (ref 0–40)
WBC # BLD AUTO: 6.2 X10*3/UL (ref 4.4–11.3)

## 2024-10-24 PROCEDURE — 87536 HIV-1 QUANT&REVRSE TRNSCRPJ: CPT | Performed by: INTERNAL MEDICINE

## 2024-10-24 PROCEDURE — 3074F SYST BP LT 130 MM HG: CPT | Performed by: INTERNAL MEDICINE

## 2024-10-24 PROCEDURE — 1036F TOBACCO NON-USER: CPT | Performed by: INTERNAL MEDICINE

## 2024-10-24 PROCEDURE — 90662 IIV NO PRSV INCREASED AG IM: CPT | Performed by: INTERNAL MEDICINE

## 2024-10-24 PROCEDURE — 3078F DIAST BP <80 MM HG: CPT | Performed by: INTERNAL MEDICINE

## 2024-10-24 PROCEDURE — 4274F FLU IMMUNO ADMIND RCVD: CPT | Performed by: INTERNAL MEDICINE

## 2024-10-24 PROCEDURE — 85025 COMPLETE CBC W/AUTO DIFF WBC: CPT | Performed by: INTERNAL MEDICINE

## 2024-10-24 PROCEDURE — 1159F MED LIST DOCD IN RCRD: CPT | Performed by: INTERNAL MEDICINE

## 2024-10-24 PROCEDURE — 80061 LIPID PANEL: CPT | Performed by: INTERNAL MEDICINE

## 2024-10-24 PROCEDURE — 86318 IA INFECTIOUS AGENT ANTIBODY: CPT | Performed by: INTERNAL MEDICINE

## 2024-10-24 PROCEDURE — 99214 OFFICE O/P EST MOD 30 MIN: CPT | Performed by: INTERNAL MEDICINE

## 2024-10-24 PROCEDURE — 1160F RVW MEDS BY RX/DR IN RCRD: CPT | Performed by: INTERNAL MEDICINE

## 2024-10-24 PROCEDURE — 1125F AMNT PAIN NOTED PAIN PRSNT: CPT | Performed by: INTERNAL MEDICINE

## 2024-10-24 PROCEDURE — 36415 COLL VENOUS BLD VENIPUNCTURE: CPT | Performed by: INTERNAL MEDICINE

## 2024-10-24 PROCEDURE — 88185 FLOWCYTOMETRY/TC ADD-ON: CPT | Performed by: INTERNAL MEDICINE

## 2024-10-24 ASSESSMENT — ENCOUNTER SYMPTOMS
FACIAL SWELLING: 0
DIARRHEA: 0
SHORTNESS OF BREATH: 0
DIAPHORESIS: 0
SINUS PAIN: 0
CHEST TIGHTNESS: 0
FATIGUE: 0
NAUSEA: 0
CHILLS: 0
RHINORRHEA: 0

## 2024-10-24 ASSESSMENT — PAIN SCALES - GENERAL: PAINLEVEL_OUTOF10: 4

## 2024-10-24 NOTE — PROGRESS NOTES
Infectious Diseases Clinic Follow-up:    Reason for Visit: regular follow up       History of Current Issue  Rosenda Anderson is a 64 y.o. year old female with pmh of HIV controlled on Biktarvy, acute limb ischemia s/p revascularization coming in for 6 month follow up. Patient said that her mother recently passed away and she is feeling down. Only missed a couple of doses of biktarvy. Only complaint is she is having numbness in her left leg but this has not changed since her revascularization in 2023. She was taking gabapentin for it but the numbness and tingling had not improved with the gabapentin so she stopped taking it.          Taking her ART and BP meds regularly.  Her brother is staying in the house downstairs. She is living alone but good she says.  Another grandbaby due soon      PAST MEDICAL HISTORY:  Past Medical History:   Diagnosis Date    Benign neoplasm, unspecified site 12/19/2017    Adenomatous polyps    Chronic viral hepatitis C (Multi) 03/17/2020    Chronic viral hepatitis C    Latent tuberculosis     Latent tuberculosis by skin test    Pain in right leg     Lower extremity pain, bilateral    Personal history of cervical dysplasia     History of cervical dysplasia    Personal history of other infectious and parasitic diseases     History of syphilis    Personal history of other mental and behavioral disorders     History of depression    Personal history of other specified conditions     History of insomnia       PAST SURGICAL HISTORY:  Past Surgical History:   Procedure Laterality Date    COLPOSCOPY  02/19/2014    Colposcopy    CT ANGIO CORONARY ART WITH HEARTFLOW IF SCORE >30%  12/20/2018    CT HEART CORONARY ANGIO[place] [facility] [place]    CT AORTA AND BILATERAL ILIOFEMORAL RUNOFF ANGIOGRAM W AND/OR WO IV CONTRAST  4/23/2023    CT AORTA AND BILATERAL ILIOFEMORAL RUNOFF ANGIOGRAM W AND/OR WO IV CONTRAST [facility] [place]    CT GUIDED PERCUTANEOUS BIOPSY LUNG  5/17/2021    CT GUIDED PERCUTANEOUS  BIOPSY LUNG 5[place]17/2021 [facility] [place]    OTHER SURGICAL HISTORY  04/18/2014    PTA Iliac Initial Stenosis With Stent    SPLENECTOMY, TOTAL  02/19/2014    Splenectomy       ALLERGIES:    No Known Allergies    MEDICATIONS:      Current Outpatient Medications:     atorvastatin (Lipitor) 80 mg tablet, Take 1 tablet (80 mg) by mouth once daily., Disp: 30 tablet, Rfl: 11    Biktarvy -25 mg tablet, TAKE 1 TABLET BY MOUTH DAILY, Disp: 30 tablet, Rfl: 5    clopidogrel (Plavix) 75 mg tablet, TAKE 1 TABLET EVERY DAY, Disp: 30 tablet, Rfl: 0    hydroCHLOROthiazide (HYDRODiuril) 25 mg tablet, TAKE 1 TABLET (25 MG) BY MOUTH ONCE DAILY., Disp: 30 tablet, Rfl: 3    lisinopril 40 mg tablet, TAKE (1) TABLET BY MOUTH DAILY, Disp: 30 tablet, Rfl: 3    Xarelto 20 mg tablet, TAKE 1 TABLET BY MOUTH EVERY DAY, Disp: 30 tablet, Rfl: 0    REVIEW OF SYSTEMS:  Review of Systems   Constitutional:  Negative for chills, diaphoresis and fatigue.   HENT:  Negative for facial swelling, rhinorrhea and sinus pain.    Respiratory:  Negative for chest tightness and shortness of breath.    Cardiovascular:  Negative for chest pain.   Gastrointestinal:  Negative for diarrhea and nausea.       PHYSICAL EXAMINATION:       Visit Vitals  /68 (BP Location: Right arm, Patient Position: Sitting, BP Cuff Size: Adult)   Pulse 83   Resp 18   Wt 50.4 kg (111 lb 3.2 oz)   LMP  (LMP Unknown)   SpO2 97%   BMI 17.95 kg/m²   OB Status Postmenopausal   Smoking Status Former   BSA 1.53 m²        EXAM:       Physical Exam  Constitutional:       General: not in acute distress.     Appearance: Normal appearance.   HENT:      Head: Normocephalic and atraumatic.      Pharynx: Oropharynx is clear. No oropharyngeal exudate. Prominent right parotid with no discrete mass  Eyes:      General: No scleral icterus.  Breast:no masses on right, no R axilllary JAYLENE, Area of tenderness is more over the pectoralis than the breat, ? muscular  Cardiovascular:      Rate and Rhythm:  Normal rate and regular rhythm.   Pulmonary:      Breath sounds: Normal breath sounds. No wheezing or rhonchi.   Abdominal:      Palpations: Abdomen is soft.      Tenderness: There is no abdominal tenderness.   Musculoskeletal:      Cervical back: Neck supple.      Right lower leg: No edema.      Left lower leg: No edema.   Skin:     Findings: No rash.   Neurological:      Mental Status: alert.   Psychiatric:         Mood and Affect: Mood normal.     ASSESSMENT / RECOMMENDATIONS:          Problem List Items Addressed This Visit       Hypertension - Primary    Overview     Well controlled on lisinopril and hydrochlorothiazide          Peripheral vascular disease (CMS-HCC)    Overview     Trying to coordinate with vascular surgeons for clinic follow up  -taking xarelto and plavix   -stopped taking gabapentin because not helping with numbness in leg          HIV disease (Multi)    Overview     -controlled on Biktarvy (only missed 5 doses over the last few weeks)   -last HIV count was 68 but no T cells  -will repeat CD4 and HIV count today          History of lung cancer    Overview     -chest CT did not demonstrate recurrence of lung cancer: 10/3          Hyperlipidemia, unspecified    Overview     Controlled on atorvastatin          Primary malignant neoplasm of right upper lobe of lung (Multi)    Routine adult health maintenance    Overview     -wants to follow up with dentist-gave number for [facility]  -received flu vaccine today  -not sexually active  -not smoking ( 8 years now)                Lety Bar DO   PGY-1, Department of Internal Medicine    I saw and evaluated the patient. I personally obtained the key and critical portions of the history and physical exam or was physically present for key and critical portions performed by the resident/fellow. I reviewed the resident/fellow's documentation and discussed the patient with the resident/fellow. I agree with the resident/fellow's medical decision making as  documented in the note.  With edits included above    Lindsay Kang MD

## 2024-10-25 LAB
CD3+CD4+ CELLS # BLD: 0.32 X10E9/L
CD3+CD4+ CELLS # BLD: 320 /MM3
CD3+CD4+ CELLS NFR BLD: 15 %
CD3+CD4+ CELLS/CD3+CD8+ CLL BLD: 0.23 %
CD3+CD8+ CELLS # BLD: 1.39 X10E9/L
CD3+CD8+ CELLS NFR BLD: 65 %
HIV1 RNA # PLAS NAA DL=20: NOT DETECTED {COPIES}/ML
HIV1 RNA SPEC NAA+PROBE-LOG#: NORMAL {LOG_COPIES}/ML
LYMPHOCYTES # SPEC AUTO: 2.13 X10*3/UL
RPR SER QL: NONREACTIVE

## 2024-10-28 DIAGNOSIS — Z95.820 PERIPHERAL VASCULAR ANGIOPLASTY STATUS WITH IMPLANTS AND GRAFTS: ICD-10-CM

## 2024-10-28 DIAGNOSIS — I10 HYPERTENSION, UNSPECIFIED TYPE: ICD-10-CM

## 2024-10-29 DIAGNOSIS — Z95.820 PERIPHERAL VASCULAR ANGIOPLASTY STATUS WITH IMPLANTS AND GRAFTS: ICD-10-CM

## 2024-10-29 RX ORDER — RIVAROXABAN 20 MG/1
20 TABLET, FILM COATED ORAL DAILY
Qty: 30 TABLET | Refills: 0 | Status: SHIPPED | OUTPATIENT
Start: 2024-10-29

## 2024-10-29 RX ORDER — HYDROCHLOROTHIAZIDE 25 MG/1
25 TABLET ORAL DAILY
Qty: 30 TABLET | Refills: 3 | Status: SHIPPED | OUTPATIENT
Start: 2024-10-29

## 2024-10-29 RX ORDER — CLOPIDOGREL BISULFATE 75 MG/1
75 TABLET ORAL DAILY
Qty: 30 TABLET | Refills: 0 | Status: SHIPPED | OUTPATIENT
Start: 2024-10-29

## 2024-11-05 DIAGNOSIS — Z95.820 PERIPHERAL VASCULAR ANGIOPLASTY STATUS WITH IMPLANTS AND GRAFTS: ICD-10-CM

## 2024-11-05 RX ORDER — CLOPIDOGREL BISULFATE 75 MG/1
75 TABLET ORAL DAILY
Qty: 30 TABLET | Refills: 0 | Status: SHIPPED | OUTPATIENT
Start: 2024-11-05

## 2024-11-05 RX ORDER — RIVAROXABAN 20 MG/1
20 TABLET, FILM COATED ORAL DAILY
Qty: 30 TABLET | Refills: 0 | Status: SHIPPED | OUTPATIENT
Start: 2024-11-05

## 2024-12-03 DIAGNOSIS — Z95.820 PERIPHERAL VASCULAR ANGIOPLASTY STATUS WITH IMPLANTS AND GRAFTS: ICD-10-CM

## 2024-12-03 DIAGNOSIS — B20 HUMAN IMMUNODEFICIENCY VIRUS (MULTI): ICD-10-CM

## 2024-12-04 DIAGNOSIS — B20 HUMAN IMMUNODEFICIENCY VIRUS (MULTI): ICD-10-CM

## 2024-12-04 RX ORDER — BICTEGRAVIR SODIUM, EMTRICITABINE, AND TENOFOVIR ALAFENAMIDE FUMARATE 50; 200; 25 MG/1; MG/1; MG/1
1 TABLET ORAL DAILY
Qty: 30 TABLET | Refills: 5 | OUTPATIENT
Start: 2024-12-04

## 2024-12-04 RX ORDER — CLOPIDOGREL BISULFATE 75 MG/1
75 TABLET ORAL DAILY
Qty: 30 TABLET | Refills: 0 | Status: SHIPPED | OUTPATIENT
Start: 2024-12-04

## 2024-12-04 RX ORDER — BICTEGRAVIR SODIUM, EMTRICITABINE, AND TENOFOVIR ALAFENAMIDE FUMARATE 50; 200; 25 MG/1; MG/1; MG/1
1 TABLET ORAL DAILY
Qty: 30 TABLET | Refills: 5 | Status: SHIPPED | OUTPATIENT
Start: 2024-12-04

## 2024-12-11 ENCOUNTER — APPOINTMENT (OUTPATIENT)
Dept: CARDIOLOGY | Facility: HOSPITAL | Age: 64
End: 2024-12-11
Payer: COMMERCIAL

## 2025-01-06 DIAGNOSIS — I10 HYPERTENSION, UNSPECIFIED TYPE: ICD-10-CM

## 2025-01-06 DIAGNOSIS — Z95.820 PERIPHERAL VASCULAR ANGIOPLASTY STATUS WITH IMPLANTS AND GRAFTS: ICD-10-CM

## 2025-01-06 RX ORDER — CLOPIDOGREL BISULFATE 75 MG/1
75 TABLET ORAL DAILY
Qty: 30 TABLET | Refills: 0 | Status: SHIPPED | OUTPATIENT
Start: 2025-01-06

## 2025-01-06 RX ORDER — LISINOPRIL 40 MG/1
40 TABLET ORAL DAILY
Qty: 30 TABLET | Refills: 3 | Status: SHIPPED | OUTPATIENT
Start: 2025-01-06

## 2025-02-01 DIAGNOSIS — I10 HYPERTENSION, UNSPECIFIED TYPE: ICD-10-CM

## 2025-02-01 DIAGNOSIS — Z95.820 PERIPHERAL VASCULAR ANGIOPLASTY STATUS WITH IMPLANTS AND GRAFTS: ICD-10-CM

## 2025-02-03 RX ORDER — HYDROCHLOROTHIAZIDE 25 MG/1
25 TABLET ORAL DAILY
Qty: 30 TABLET | Refills: 3 | Status: SHIPPED | OUTPATIENT
Start: 2025-02-03

## 2025-02-03 RX ORDER — CLOPIDOGREL BISULFATE 75 MG/1
75 TABLET ORAL DAILY
Qty: 30 TABLET | Refills: 0 | Status: SHIPPED | OUTPATIENT
Start: 2025-02-03

## 2025-02-25 DIAGNOSIS — I73.9 PERIPHERAL VASCULAR DISEASE (CMS-HCC): Primary | ICD-10-CM

## 2025-02-27 DIAGNOSIS — Z95.820 PERIPHERAL VASCULAR ANGIOPLASTY STATUS WITH IMPLANTS AND GRAFTS: ICD-10-CM

## 2025-02-27 RX ORDER — CLOPIDOGREL BISULFATE 75 MG/1
75 TABLET ORAL DAILY
Qty: 90 TABLET | Refills: 1 | Status: SHIPPED | OUTPATIENT
Start: 2025-02-27

## 2025-02-27 RX ORDER — RIVAROXABAN 20 MG/1
TABLET, FILM COATED ORAL
Qty: 90 TABLET | Refills: 1 | Status: SHIPPED | OUTPATIENT
Start: 2025-02-27

## 2025-04-03 ENCOUNTER — APPOINTMENT (OUTPATIENT)
Dept: RADIOLOGY | Facility: HOSPITAL | Age: 65
End: 2025-04-03
Payer: COMMERCIAL

## 2025-04-03 ENCOUNTER — APPOINTMENT (OUTPATIENT)
Dept: RADIATION ONCOLOGY | Facility: HOSPITAL | Age: 65
End: 2025-04-03
Payer: COMMERCIAL

## 2025-04-04 ENCOUNTER — APPOINTMENT (OUTPATIENT)
Dept: RADIOLOGY | Facility: HOSPITAL | Age: 65
End: 2025-04-04
Payer: COMMERCIAL

## 2025-04-07 ENCOUNTER — APPOINTMENT (OUTPATIENT)
Dept: RADIATION ONCOLOGY | Facility: HOSPITAL | Age: 65
End: 2025-04-07
Payer: COMMERCIAL

## 2025-04-09 ENCOUNTER — TELEPHONE (OUTPATIENT)
Dept: RADIATION ONCOLOGY | Facility: HOSPITAL | Age: 65
End: 2025-04-09
Payer: COMMERCIAL

## 2025-04-09 NOTE — TELEPHONE ENCOUNTER
Called pt to remind of appointment on 4/10/2025 at 3:30. Pt's phone went to voicemail left number if needs to reschedule.

## 2025-04-10 ENCOUNTER — HOSPITAL ENCOUNTER (OUTPATIENT)
Dept: RADIATION ONCOLOGY | Facility: HOSPITAL | Age: 65
Setting detail: RADIATION/ONCOLOGY SERIES
Discharge: HOME | End: 2025-04-10
Payer: COMMERCIAL

## 2025-04-10 ENCOUNTER — HOSPITAL ENCOUNTER (OUTPATIENT)
Dept: RADIOLOGY | Facility: HOSPITAL | Age: 65
Discharge: HOME | End: 2025-04-10
Payer: COMMERCIAL

## 2025-04-10 VITALS
RESPIRATION RATE: 18 BRPM | DIASTOLIC BLOOD PRESSURE: 86 MMHG | BODY MASS INDEX: 18.33 KG/M2 | OXYGEN SATURATION: 95 % | WEIGHT: 113.54 LBS | SYSTOLIC BLOOD PRESSURE: 151 MMHG | HEART RATE: 100 BPM | TEMPERATURE: 97 F

## 2025-04-10 DIAGNOSIS — C34.91 NON-SMALL CELL CANCER OF RIGHT LUNG (MULTI): ICD-10-CM

## 2025-04-10 DIAGNOSIS — C34.91 NON-SMALL CELL CANCER OF RIGHT LUNG (MULTI): Primary | ICD-10-CM

## 2025-04-10 PROCEDURE — 99214 OFFICE O/P EST MOD 30 MIN: CPT | Performed by: NURSE PRACTITIONER

## 2025-04-10 PROCEDURE — 71250 CT THORAX DX C-: CPT

## 2025-04-10 ASSESSMENT — ENCOUNTER SYMPTOMS
CHEST TIGHTNESS: 0
MUSCULOSKELETAL NEGATIVE: 1
APNEA: 0
FATIGUE: 0
APPETITE CHANGE: 0
PSYCHIATRIC NEGATIVE: 1
HEMATOLOGIC/LYMPHATIC NEGATIVE: 1
CHILLS: 0
UNEXPECTED WEIGHT CHANGE: 0
LOSS OF SENSATION IN FEET: 0
CHOKING: 0
FEVER: 0
STRIDOR: 0
GASTROINTESTINAL NEGATIVE: 1
SHORTNESS OF BREATH: 0
DIAPHORESIS: 0
OCCASIONAL FEELINGS OF UNSTEADINESS: 0
WHEEZING: 0
DEPRESSION: 1
COUGH: 0
ACTIVITY CHANGE: 0
NEUROLOGICAL NEGATIVE: 1
CARDIOVASCULAR NEGATIVE: 1

## 2025-04-10 ASSESSMENT — PATIENT HEALTH QUESTIONNAIRE - PHQ9
7. TROUBLE CONCENTRATING ON THINGS, SUCH AS READING THE NEWSPAPER OR WATCHING TELEVISION: NOT AT ALL
9. THOUGHTS THAT YOU WOULD BE BETTER OFF DEAD, OR OF HURTING YOURSELF: NOT AT ALL
1. LITTLE INTEREST OR PLEASURE IN DOING THINGS: SEVERAL DAYS
2. FEELING DOWN, DEPRESSED OR HOPELESS: MORE THAN HALF THE DAYS
8. MOVING OR SPEAKING SO SLOWLY THAT OTHER PEOPLE COULD HAVE NOTICED. OR THE OPPOSITE, BEING SO FIGETY OR RESTLESS THAT YOU HAVE BEEN MOVING AROUND A LOT MORE THAN USUAL: NOT AT ALL
SUM OF ALL RESPONSES TO PHQ9 QUESTIONS 1 AND 2: 3
SUM OF ALL RESPONSES TO PHQ QUESTIONS 1-9: 4
3. TROUBLE FALLING OR STAYING ASLEEP OR SLEEPING TOO MUCH: SEVERAL DAYS
6. FEELING BAD ABOUT YOURSELF - OR THAT YOU ARE A FAILURE OR HAVE LET YOURSELF OR YOUR FAMILY DOWN: NOT AT ALL
4. FEELING TIRED OR HAVING LITTLE ENERGY: NOT AT ALL
5. POOR APPETITE OR OVEREATING: NOT AT ALL

## 2025-04-10 ASSESSMENT — COLUMBIA-SUICIDE SEVERITY RATING SCALE - C-SSRS
6. HAVE YOU EVER DONE ANYTHING, STARTED TO DO ANYTHING, OR PREPARED TO DO ANYTHING TO END YOUR LIFE?: NO
2. HAVE YOU ACTUALLY HAD ANY THOUGHTS OF KILLING YOURSELF?: NO
1. IN THE PAST MONTH, HAVE YOU WISHED YOU WERE DEAD OR WISHED YOU COULD GO TO SLEEP AND NOT WAKE UP?: NO

## 2025-04-10 ASSESSMENT — PAIN SCALES - GENERAL: PAINLEVEL_OUTOF10: 0-NO PAIN

## 2025-04-10 NOTE — PROGRESS NOTES
Patient ID: [id number]     Cancer Staging:   Treatment Synopsis:    64-year-old patient who had a routine CT scan of the chest done on April 22, 2021 which showed a spiculated nodule measuring 1.8 cm which in the right upper for lung.   There were multiple other nonspecific pulmonary nodules including a 5 mm nodule within the left upper lobe a 4 mm nodule within the right lower lobe and when these images are compared to imaging on a coronary artery CT angiogram of December 20, 2018  and most of the nodules are chronic. She was sent for a PET scan on June 2, 2021 showing hypermetabolic activity  within the right upper lobe lesion with a maximum SUV of 11.1.  There was some nonspecific mild subcentimeter bilateral axillary lymphadenopathy.  As she was undergoing these process she was admitted on May 20, 2021 for a limb ischemia. She had stopped  her aspirin and Plavix a week before the biopsy and she developed an occluded stent on the left side.  This required intervention for acute arterial obstruction on the left side with the revascularization.  She is was put back on anticoagulation.     DIAGNOSIS     Poorly differentiated adenocarcinoma of the lung.  Date of diagnosis is May 17, 2021 from a right upper lobe CT-guided core biopsy.  Immunohistochemistry positive for TTF-1 and negative for P 40.     Unable to have surgery due to not being able to hold anticoagulation.     SBRT completed on 8/31/21.       History of presenting illness    Rosenda Anderson is a 64 y.o. female who presents today for follow up 3 years and 8 months s/p SBRT to the right lung. Overall, patient feels good. Energy is baseline.  Appetite good. Weight stable.  Denies cough, SOB, chest pain,  back pain or fevers. No oxygen or inhaler requirements. Smokes marijuana daily. No neurological complaints. Following with Dr. Panchal for thyroid nodules. CT of the chest done prior to this visit.       Review of systems:  Review of Systems    Constitutional:  Negative for activity change, appetite change, chills, diaphoresis, fatigue, fever and unexpected weight change.   HENT: Negative.     Respiratory:  Negative for apnea, cough, choking, chest tightness, shortness of breath, wheezing and stridor.    Cardiovascular: Negative.    Gastrointestinal: Negative.    Musculoskeletal: Negative.    Skin: Negative.    Neurological: Negative.    Hematological: Negative.    Psychiatric/Behavioral: Negative.         Past Medical history  She  has a past surgical history that includes Colposcopy (02/19/2014); Splenectomy, total (02/19/2014); Other surgical history (04/18/2014); CT guided percutaneous biopsy lung (5/17/2021); CT angio coronary art with heartflow if score >30% (12/20/2018); and CT angio aorta and bilateral iliofemoral runoff including without contrast if performed (4/23/2023).        Last recorded vital:  /86   Pulse 100   Temp 36.1 °C (97 °F) (Temporal)   Resp 18   Wt 51.5 kg (113 lb 8.6 oz)   LMP  (LMP Unknown)   SpO2 95%   BMI 18.33 kg/m²     Physical exam  Physical Exam  Constitutional:       General: She is not in acute distress.     Appearance: Normal appearance. She is not ill-appearing, toxic-appearing or diaphoretic.   HENT:      Head: Normocephalic.   Cardiovascular:      Rate and Rhythm: Normal rate and regular rhythm.      Pulses: Normal pulses.      Heart sounds: Normal heart sounds.   Pulmonary:      Effort: Pulmonary effort is normal. No respiratory distress.      Breath sounds: Normal breath sounds. No stridor. No wheezing, rhonchi or rales.   Abdominal:      General: Bowel sounds are normal. There is no distension.      Palpations: Abdomen is soft. There is no mass.      Tenderness: There is no abdominal tenderness. There is no guarding or rebound.      Hernia: No hernia is present.   Musculoskeletal:         General: Normal range of motion.      Cervical back: Normal range of motion and neck supple.   Skin:     General:  Skin is warm and dry.   Neurological:      General: No focal deficit present.      Mental Status: She is alert and oriented to person, place, and time.   Psychiatric:         Mood and Affect: Mood normal.         Behavior: Behavior normal.         Thought Content: Thought content normal.         Judgment: Judgment normal.           Radiology results:  No results found.     Plan:  Assessment/Plan     64 year old female 3 years and 8 months s/p SBRT to the right lung. Patient doing well. No acute complaints related to radiation. CT scan from today shows sContinue follow up with Dr. Panchal as scheduled.   Patient to return to clinic in 6 months with same day CT of the chest unless needs to be seen sooner.   Instructed to call with any questions or concerns.                 enlarged.    No evidence of pericardial effusion.    UPPER ABDOMEN:  Postsurgical changes within the upper quadrants.    CHEST WALL AND OSSEOUS STRUCTURES:  There are no suspicious osseous lesions. Multilevel degenerative  changes are present    Impression  1. Stable right upper lobe parenchymal changes consistent with prior  radiation changes. No evidence of new lung nodules or locoregional  recurrence. 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  2. Severe emphysema and coronary artery calcifications.    MACRO:  None    Signed by: Art Heredia 4/10/2025 5:25 PM  Dictation workstation:   [id number]     Plan:  Assessment/Plan     64 year old female 3 years and 8 months s/p SBRT to the right lung. Patient doing well. No acute complaints related to radiation. CT scan from today is stable. Continue follow up with Dr. Panchal as scheduled.   Patient to return to clinic in 6 months with same day CT of the chest unless needs to be seen sooner.   Instructed to call with any questions or concerns.

## 2025-04-25 ENCOUNTER — HOSPITAL ENCOUNTER (OUTPATIENT)
Dept: RADIOLOGY | Facility: HOSPITAL | Age: 65
Discharge: HOME | End: 2025-04-25
Payer: COMMERCIAL

## 2025-04-25 ENCOUNTER — HOSPITAL ENCOUNTER (OUTPATIENT)
Dept: VASCULAR MEDICINE | Facility: HOSPITAL | Age: 65
Discharge: HOME | End: 2025-04-25
Payer: COMMERCIAL

## 2025-04-25 DIAGNOSIS — I73.9 PERIPHERAL VASCULAR DISEASE (CMS-HCC): ICD-10-CM

## 2025-04-25 PROCEDURE — 93926 LOWER EXTREMITY STUDY: CPT

## 2025-04-25 PROCEDURE — 93922 UPR/L XTREMITY ART 2 LEVELS: CPT

## 2025-04-25 PROCEDURE — 93926 LOWER EXTREMITY STUDY: CPT | Performed by: STUDENT IN AN ORGANIZED HEALTH CARE EDUCATION/TRAINING PROGRAM

## 2025-04-25 PROCEDURE — 93922 UPR/L XTREMITY ART 2 LEVELS: CPT | Performed by: STUDENT IN AN ORGANIZED HEALTH CARE EDUCATION/TRAINING PROGRAM

## 2025-04-28 ENCOUNTER — DOCUMENTATION (OUTPATIENT)
Dept: CARDIOLOGY | Facility: HOSPITAL | Age: 65
End: 2025-04-28
Payer: COMMERCIAL

## 2025-04-28 DIAGNOSIS — I73.9 PAD (PERIPHERAL ARTERY DISEASE) (CMS-HCC): Primary | ICD-10-CM

## 2025-04-28 PROCEDURE — 99213 OFFICE O/P EST LOW 20 MIN: CPT

## 2025-04-28 NOTE — PROGRESS NOTES
Called Ms. Anderson to review ABEL/TBI and Vascular US lower extremities.     Evidence of mild arterial disease on ABEL/TBI, slightly worsened compared to previous exam in 2024, US with evidence of patent graft.    Ms. Anderson is reporting reproducible claudication calf pain when walking that is alleviated by rest. Plan to schedule FUV. Denies wounds on either foot, no other concerns and all questions answered.

## 2025-04-30 NOTE — PROGRESS NOTES
Referred by Dr. Foster ref. provider found provider found for No chief complaint on file.       History of Present Illness  Rosenda Anderson is a 64 y.o. year old female patient with h/o HTN, HLD, HIV, Hep C, splenectomy, NSCLC dx in 2021 s/p RTX, thyroid nodules and Ex-smoker      PAD   _ Known history of PAD since at least 2013  _ Multiple episodes of LLE CLI requiring multiple interventions   _ 5/2021 left distal SFA/pop stent, with evidence of stent occlusion s/p re-intervention on, dc on ASA + xarelto 2.5 BID, with multiple interventions in [id number], and her medications were changed to Xarelto 20 daily and Plavix. Again evidence of occlusion with distal reconstitution noted on CTA s/p left femoral-pop bypass grafting on 6/23/2023 by Dr. Mccloud  She has been on Xarelto 20 qd, Plavix & Pletal since  ABEL 4/25/25: Rt 0.88/0.74 Lt 0.89/0.80 Evidence of mild arterial occlusive disease   Arterial Duplex 4/25/25: Left Lower Arterial: Known occluded SFA artery.  Left Bypass Graft: The left femoropopliteal bypass graft appears widely patent. The bypass is constructed of PTFE graft. Elevated velocities at the proximal and distal anastomosis. The shift in velocity is possibly due to the vessel mismatch of the PTFE into the native vessel.         Past Medical History  Medical History[1]    Past Surgical History  Surgical History[2]    Social History  Social History[3]    Family History   Family History[4]    Review of Systems  As per HPI, all other systems reviewed and negative.    Allergies:  RX Allergies[5]     Outpatient Medications:  Current Outpatient Medications   Medication Instructions    atorvastatin (LIPITOR) 80 mg, oral, Daily    igoryljoi-llixgcgr-bqifirp ala (Biktarvy) -25 mg tablet 1 tablet, oral, Daily    clopidogrel (PLAVIX) 75 mg, oral, Daily    hydroCHLOROthiazide (HYDRODIURIL) 25 mg, oral, Daily    lisinopril 40 mg, oral, Daily    Xarelto 20 mg tablet TAKE 1 TABLET (20 MG) BY MOUTH ONCE DAILY. WITH DIET          Vitals:  There were no vitals filed for this visit.    Physical Exam:  Physical Exam      Reviewed Study(s):    Cardiac Studies  Echo: No results found for this or any previous visit.  Angiogram:     Vascular Studies   Vascular US Lower Extremity Arterial Duplex Left              [place] [address]    Tel [phone] and Fax [phone]       Vascular Lab Report  [facility] US LOWER EXTREMITY ARTERIAL DUPLEX LEFT       Patient Name:     PILO MOSES      Reading Physician: [id number] Sanjay Unger MD  Study Date:       4/25/2025           Ordering           [id number] MARTÍNEZ GARCIA                                        Physician:  MRN/PID:          [id number]            Technologist:      Marybeth Nieves [facility]  Accession#:       [id number]        Technologist 2:  Date of           1960 / 64      Encounter#:        [id number]  Birth/Age:        years  Gender:           F  Admission Status: Outpatient          Location           [facility]                                        Performed:       Diagnosis/ICD: Peripheral vascular disease, unspecified-I73.9  CPT Codes:     02805 Peripheral artery Lower arterial Duplex limited       CONCLUSIONS:  Left Lower Arterial: Known occluded SFA artery.  Left Bypass Graft: The left femoropopliteal bypass graft appears widely patent. The bypass is constructed of PTFE graft. Elevated velocities at the proximal and distal anastomosis. The shift in velocity is possibly due to the vessel mismatch of the PTFE into the native vessel.     Comparison:  Compared with study from 7/31/2023, no significant change.     Imaging & Doppler Findings:     Bypass Graft Surveillance: Left Fem Pop  Inflow Artery    120 cm/s  Prox Anast       102 cm/s  Prox Graft       38 cm/s  Prox/Mid Graft   33 cm/s  Mid Graft        21 cm/s  Mid/Distal Graft 24 cm/s  Distal Graft     24 cm/s  Distal  Anast     67 cm/s  Outflow Artery   67 cm/s       Right          Left   PSV           PSV        SFA Mid 0 cm/s       [id number] Sanjay nUger MD  Electronically signed by [id number] Sanjay Unger MD on 4/26/2025 at 9:56:13 PM       ** Final **  Vascular US Ankle Brachial Index (BAEL) Without Exercise              [place] [address]    Tel [phone] and Fax [phone]       Vascular Lab Report  VASC US ANKLE BRACHIAL INDEX (ABEL) WITHOUT EXERCISE       Patient Name:     PILO MOSES      Reading Physician: [id number] Unger MD  Study Date:       4/25/2025           Ordering           [id number] MARTÍNEZ GARCIA                                        Physician:  MRN/PID:          [id number]            Technologist:      Marybeth Nieves LYNETTE  Accession#:       [id number]        Technologist 2:  Date of           1960 / 64      Encounter#:        [id number]  Birth/Age:        years  Gender:           F  Admission Status: Outpatient          Location           [facility]                                        Performed:       Diagnosis/ICD: Peripheral vascular disease, unspecified-I73.9  CPT Codes:     68968 Peripheral artery ABEL Only       CONCLUSIONS:  Right Lower PVR: Evidence of mild arterial occlusive disease in the right lower extremity at rest. Normal digital perfusion noted. Multiphasic flow is noted in the right common femoral artery, right posterior tibial artery and right dorsalis pedis artery.  Left Lower PVR: Evidence of mild arterial occlusive disease in the left lower extremity at rest. Normal digital perfusion noted. Multiphasic flow is noted in the left common femoral artery, left posterior tibial artery and left dorsalis pedis artery.     Comparison:  Compared with study from 6/21/2024, Today's study: Bilateral lower extremities demonstrate evidence of mild arterial occlusive disease.      Imaging & Doppler Findings:     RIGHT Lower PVR                Pressures Ratios  Right Posterior Tibial (Ankle) 134 mmHg  0.88  Right Dorsalis Pedis (Ankle)   134 mmHg  0.88  Right Digit (Great Toe)        113 mmHg  0.74          LEFT Lower PVR                Pressures Ratios  Left Posterior Tibial (Ankle) 135 mmHg  0.89  Left Dorsalis Pedis (Ankle)   124 mmHg  0.82  Left Digit (Great Toe)        122 mmHg  0.80                          Right     Left  Brachial Pressure 152 mmHg 146 mmHg          [id number] Sanjay Unger MD  Electronically signed by [id number] Sanjay Unger MD on 4/26/2025 at 8:58:28 PM       ** Final **       Assessment/Plan            [1]   Past Medical History:  Diagnosis Date    Benign neoplasm, unspecified site 12/19/2017    Adenomatous polyps    Chronic viral hepatitis C (Multi) 03/17/2020    Chronic viral hepatitis C    Latent tuberculosis     Latent tuberculosis by skin test    Pain in right leg     Lower extremity pain, bilateral    Personal history of cervical dysplasia     History of cervical dysplasia    Personal history of other infectious and parasitic diseases     History of syphilis    Personal history of other mental and behavioral disorders     History of depression    Personal history of other specified conditions     History of insomnia   [2]   Past Surgical History:  Procedure Laterality Date    COLPOSCOPY  02/19/2014    Colposcopy    CT ANGIO AORTA AND BILATERAL ILIOFEMORAL RUNOFF W AND OR WO IV CONTRAST  4/23/2023    CT AORTA AND BILATERAL ILIOFEMORAL RUNOFF ANGIOGRAM [place] AND/OR WO [place] [facility] [place]    [place] ANGIO CORONARY ART WITH HEARTFLOW IF SCORE >30%  12/20/2018    CT HEART CORONARY ANGIOGRAM [place] [facility] [place] [facility] [place]    CT GUIDED PERCUTANEOUS BIOPSY LUNG  5/17/2021    CT GUIDED PERCUTANEOUS BIOPSY LUNG [place]20[place] [facility] [place] [facility] [place]    OTHER SURGICAL HISTORY  04/18/2014    PTA Iliac Initial Stenosis With Stent    SPLENECTOMY, TOTAL  02/19/2014    Splenectomy   [3]   Social  History  Tobacco Use    Smoking status: Former     Current packs/day: 0.00     Types: Cigarettes     Quit date:      Years since quittin.3    Smokeless tobacco: Never   Substance Use Topics    Alcohol use: Never    Drug use: Not Currently   [4]   Family History  Family history unknown: Yes   [5] No Known Allergies     and/or during this visit.     Shared visit with Dr. Jono De La Cruz,            [1]   Past Medical History:  Diagnosis Date    Benign neoplasm, unspecified site 2017    Adenomatous polyps    Chronic viral hepatitis C (Multi) 2020    Chronic viral hepatitis C    Latent tuberculosis     Latent tuberculosis by skin test    Pain in right leg     Lower extremity pain, bilateral    Personal history of cervical dysplasia     History of cervical dysplasia    Personal history of other infectious and parasitic diseases     History of syphilis    Personal history of other mental and behavioral disorders     History of depression    Personal history of other specified conditions     History of insomnia   [2]   Past Surgical History:  Procedure Laterality Date    COLPOSCOPY  2014    Colposcopy    CT ANGIO AORTA AND BILATERAL ILIOFEMORAL RUN OFF INCLUDING WITHOUT CONTRAST IF PERFORMED  2023    CT AORTA AND BILATERAL ILIOFEMORAL RUNOFF ANGIOGRAM W AND/OR WO IV CONTRAST [facility] [place]    CT ANGIO CORONARY ART WITH HEARTFLOW IF SCORE >30%  2018    CT HEART CORONARY ANGIOGRAM 2018 [facility] [place] [facility]    CT GUIDED PERCUTANEOUS BIOPSY LUNG  2021    CT GUIDED PERCUTANEOUS BIOPSY LUNG 2021 [facility] [place] [facility]    OTHER SURGICAL HISTORY  2014    PTA Iliac Initial Stenosis With Stent    SPLENECTOMY, TOTAL  2014    Splenectomy   [3]   Social History  Tobacco Use    Smoking status: Former     Current packs/day: 0.00     Types: Cigarettes     Quit date:      Years since quittin.3    Smokeless tobacco: Never   Substance Use Topics    Alcohol use: Never    Drug use: Not Currently   [4]   Family History  Family history unknown: Yes   [5] No Known Allergies

## 2025-05-06 ENCOUNTER — APPOINTMENT (OUTPATIENT)
Dept: CARDIOLOGY | Facility: CLINIC | Age: 65
End: 2025-05-06
Payer: COMMERCIAL

## 2025-05-06 VITALS
HEIGHT: 66 IN | WEIGHT: 118 LBS | DIASTOLIC BLOOD PRESSURE: 62 MMHG | SYSTOLIC BLOOD PRESSURE: 94 MMHG | HEART RATE: 88 BPM | BODY MASS INDEX: 18.96 KG/M2

## 2025-05-06 DIAGNOSIS — Z79.02 LONG TERM CURRENT USE OF CLOPIDOGREL: ICD-10-CM

## 2025-05-06 DIAGNOSIS — M79.605 LEG PAIN, ANTERIOR, LEFT: ICD-10-CM

## 2025-05-06 DIAGNOSIS — I10 HTN (HYPERTENSION): ICD-10-CM

## 2025-05-06 DIAGNOSIS — Z87.891 FORMER SMOKER: ICD-10-CM

## 2025-05-06 DIAGNOSIS — Z92.89 HISTORY OF ANGIOGRAPHY: ICD-10-CM

## 2025-05-06 DIAGNOSIS — R25.2 LEG CRAMPING: ICD-10-CM

## 2025-05-06 DIAGNOSIS — I73.9 PAD (PERIPHERAL ARTERY DISEASE): Primary | ICD-10-CM

## 2025-05-06 DIAGNOSIS — Z91.89 AT HIGH RISK FOR BLEEDING: ICD-10-CM

## 2025-05-06 DIAGNOSIS — I10 HYPERTENSION, UNSPECIFIED TYPE: ICD-10-CM

## 2025-05-06 DIAGNOSIS — E78.5 HLD (HYPERLIPIDEMIA): ICD-10-CM

## 2025-05-06 PROCEDURE — 1159F MED LIST DOCD IN RCRD: CPT | Performed by: INTERNAL MEDICINE

## 2025-05-06 PROCEDURE — 99215 OFFICE O/P EST HI 40 MIN: CPT | Performed by: INTERNAL MEDICINE

## 2025-05-06 PROCEDURE — 3074F SYST BP LT 130 MM HG: CPT | Performed by: INTERNAL MEDICINE

## 2025-05-06 PROCEDURE — 3008F BODY MASS INDEX DOCD: CPT | Performed by: INTERNAL MEDICINE

## 2025-05-06 PROCEDURE — 3078F DIAST BP <80 MM HG: CPT | Performed by: INTERNAL MEDICINE

## 2025-05-06 RX ORDER — LISINOPRIL 40 MG/1
40 TABLET ORAL DAILY
Qty: 30 TABLET | Refills: 1 | Status: SHIPPED | OUTPATIENT
Start: 2025-05-06

## 2025-05-06 NOTE — PATIENT INSTRUCTIONS
It was a pleasure taking care of you today and appreciate your seeing us at our [facility].     Today we discussed:  - Your leg pain is unlikely to be caused by vascular problems  - We reviewed your testing/images and you have good blood flow    Today's plan is as follows:  - Follow-up with your PCP for further pain work-up  - Return to clinic in 1 year with repeat testing    Please call the office with any questions at [phone], press option 1  If you need coordinating your appointments and testing you can do these at the  or by calling my office shortly after your visit.

## 2025-05-07 ENCOUNTER — OFFICE VISIT (OUTPATIENT)
Dept: CARDIOLOGY | Facility: HOSPITAL | Age: 65
End: 2025-05-07
Payer: COMMERCIAL

## 2025-05-07 VITALS
HEART RATE: 85 BPM | SYSTOLIC BLOOD PRESSURE: 114 MMHG | DIASTOLIC BLOOD PRESSURE: 74 MMHG | BODY MASS INDEX: 18.16 KG/M2 | HEIGHT: 66 IN | WEIGHT: 113 LBS | OXYGEN SATURATION: 98 %

## 2025-05-07 DIAGNOSIS — E78.5 HYPERLIPIDEMIA, UNSPECIFIED HYPERLIPIDEMIA TYPE: ICD-10-CM

## 2025-05-07 DIAGNOSIS — I73.9 PAD (PERIPHERAL ARTERY DISEASE) (CMS-HCC): Primary | ICD-10-CM

## 2025-05-07 DIAGNOSIS — R25.2 LEG CRAMPING: ICD-10-CM

## 2025-05-07 PROCEDURE — 1159F MED LIST DOCD IN RCRD: CPT | Performed by: INTERNAL MEDICINE

## 2025-05-07 PROCEDURE — 99214 OFFICE O/P EST MOD 30 MIN: CPT | Performed by: INTERNAL MEDICINE

## 2025-05-07 PROCEDURE — 3008F BODY MASS INDEX DOCD: CPT | Performed by: INTERNAL MEDICINE

## 2025-05-07 PROCEDURE — 1036F TOBACCO NON-USER: CPT | Performed by: INTERNAL MEDICINE

## 2025-05-07 PROCEDURE — 99212 OFFICE O/P EST SF 10 MIN: CPT

## 2025-05-07 PROCEDURE — 3074F SYST BP LT 130 MM HG: CPT | Performed by: INTERNAL MEDICINE

## 2025-05-07 PROCEDURE — 1125F AMNT PAIN NOTED PAIN PRSNT: CPT | Performed by: INTERNAL MEDICINE

## 2025-05-07 PROCEDURE — 3078F DIAST BP <80 MM HG: CPT | Performed by: INTERNAL MEDICINE

## 2025-05-07 ASSESSMENT — PAIN SCALES - GENERAL: PAINLEVEL_OUTOF10: 4

## 2025-05-07 NOTE — PATIENT INSTRUCTIONS
-- Continue tobacco cessation and avoid 2nd hand smoke  -- Exercise as tolerated, ideally walking with goal 30 minutes for minimum 5 days/week  -- Heart healthy diet highly recommended  -- Follow-up in 6 months, earlier if needed

## 2025-05-07 NOTE — PROGRESS NOTES
Chief complaint: PAD     Subjective   Pt is here for follow up, last seen in June 2024   ABEL 4/25/25: R 0.88/0.88/0.74, L 0.89/0.82/0.8    HLD-was on Lipitor 40 daily, last LDL checked in 3/2024 was 115, last visit we increased her Lipitor to 80  HTN- controlled   No known history of diabetes  She quit smoking in 2008  Body mass index is 18.24 kg/m².  She continues to be on her medications Plavix, Pletal and Xarelto 20 daily, tolerated well, no bleeding    She complains of bilateral LE claudication pain associated with numbness and tingling, better by leaning forward  No rest pain  No open wounds  No discoloration  Seen by Dr. De La Cruz 's team with plans for conservative management for now and surveillance imaging    Review of Systems  As noted above   Bilateral LE claudication pain associated with numbness and tingling, better by leaning forward  Chronic intermittent LLE swelling-none today   Chronic LLE below-knee numbness and sharp shooting pain  No bruises, melena or other obvious bleeding  No chest pain or shortness of breath  No lightheadedness or dizziness  No other complaints today     Past Medical History:   Diagnosis Date    Benign neoplasm, unspecified site 12/19/2017    Adenomatous polyps    Chronic viral hepatitis C (Multi) 03/17/2020    Chronic viral hepatitis C    Latent tuberculosis     Latent tuberculosis by skin test    Pain in right leg     Lower extremity pain, bilateral    Personal history of cervical dysplasia     History of cervical dysplasia    Personal history of other infectious and parasitic diseases     History of syphilis    Personal history of other mental and behavioral disorders     History of depression    Personal history of other specified conditions     History of insomnia     Past Surgical History:   Procedure Laterality Date    COLPOSCOPY  02/19/2014    Colposcopy    CT ANGIO AORTA AND BILATERAL ILIOFEMORAL RUN OFF INCLUDING WITHOUT CONTRAST IF PERFORMED  4/23/2023    CT AORTA  "AND BILATERAL ILIOFEMORAL RUNOFF ANGIOGRAM W AND/OR WO IV CONTRAST [facility] CT    CT ANGIO CORONARY ART WITH HEARTFLOW IF SCORE >30%  2018    CT HEART CORONARY ANGIOGRAM 2018 [facility] EMERGENCY LEGACY    CT GUIDED PERCUTANEOUS BIOPSY LUNG  2021    CT GUIDED PERCUTANEOUS BIOPSY LUNG 2021 [facility] AIB LEGACY    OTHER SURGICAL HISTORY  2014    PTA Iliac Initial Stenosis With Stent    SPLENECTOMY, TOTAL  2014    Splenectomy     Social History     Socioeconomic History    Marital status: Single   Tobacco Use    Smoking status: Former     Current packs/day: 0.00     Types: Cigarettes     Quit date:      Years since quittin.3    Smokeless tobacco: Never   Substance and Sexual Activity    Alcohol use: Never    Drug use: Not Currently    Sexual activity: Not Currently     Social Drivers of Health     Financial Resource Strain: Not on File (3/31/2021)    Received from [employer]    Financial Resource Strain     Financial Resource Strain: 0   Food Insecurity: Not on File (3/31/2021)    Received from [employer]    Food Insecurity     Food: 0   Transportation Needs: Not on File (3/31/2021)    Received from [employer]    Transportation Needs     Transportation: 0   Physical Activity: Not on File (3/31/2021)    Received from [employer]    Physical Activity     Physical Activity: 0   Stress: Not on File (3/31/2021)    Received from [employer]    Stress     Stress: 0   Social Connections: Not on File (3/31/2021)    Received from [employer]    Social Connections     Social Connections and Isolation: 0   Housing Stability: Not on File (3/31/2021)    Received from [employer]    Housing Stability     Housin      Family History   Family history unknown: Yes      No Known Allergies    Objective   Physical Exam  /74 (BP Location: Left arm, Patient Position: Sitting)   Pulse 85   Ht 1.676 m (5' 6\")   Wt 51.3 kg (113 lb)   BMI 18.24 kg/m²      General: In no acute distress  Neuro: alert and oriented x3  CV:  RRR  Lungs: CTA " bilaterally  Abd:  Soft, non-tender   Psych:  Appropriate affect  Upper extremities: No swelling, +2 radial   Lower extremities: LLE edema-chronic, frantz +2 DP, difficult to palpate PT, feet warms to touch   Skin:  No open ulcers or chronic venous stasis changes     Medications   Current Outpatient Medications   Medication Instructions    atorvastatin (LIPITOR) 80 mg, oral, Daily    mpnkdzahk-auemcpis-qcogzdh ala (Biktarvy) -25 mg tablet 1 tablet, oral, Daily    clopidogrel (PLAVIX) 75 mg, oral, Daily    hydroCHLOROthiazide (HYDRODIURIL) 25 mg, oral, Daily    lisinopril 40 mg, oral, Daily    Xarelto 20 mg tablet TAKE 1 TABLET (20 MG) BY MOUTH ONCE DAILY. WITH DIET       Lab Review   Recent Labs     10/03/24  1014 03/12/24  1603 06/26/23  0821 06/25/23  1731 06/25/23  0803 06/25/23  0602 06/24/23  0830 05/30/23  1237    137 139 135* 137 136 141 141   K 3.7 4.6 4.1 4.1 3.8 3.7 4.4 5.0    100 101 99 101 101 105 106   CO2 30 28 29 28 29 28 28 27   ANIONGAP 14 14 13 12 11 11 12 13   BUN 12 15 10 12 11 10 10 14   CREATININE 0.90 0.89 0.80 0.86 0.84 0.80 0.77 1.12*   EGFR 71 72  --   --   --   --   --   --    MG  --   --  2.02 2.75* 1.75 1.75  --   --      Recent Labs     10/03/24  1014 03/12/24  1603 06/26/23  0821 06/25/23  1731 06/25/23  0803 06/24/23  0830 05/30/23  1237 05/02/23  0428 05/01/23  0804 04/30/23  0934 04/22/23  1938   ALBUMIN 4.2 4.5 3.1* 3.3* 3.3*   < > 4.3   < > 3.8   < > 4.2   ALKPHOS 84 95  --   --   --   --  88  --  65  --  79   ALT 9 6*  --   --   --   --  5*  --  10  --  4*   AST 14 13  --   --   --   --  13  --  17  --  12   BILITOT 0.3 0.3  --   --   --   --  0.4  --  0.4  --  0.3    < > = values in this interval not displayed.     Recent Labs     10/24/24  1615 10/03/24  1014 03/12/24  1603 06/26/23  0821 06/25/23  1731 06/25/23  0803 06/25/23  0602 06/24/23  0830   WBC 6.2 6.6 5.9 10.6 13.2* 13.6* 12.8* 11.0   HGB 13.7 13.9 13.3 9.0* 9.9* 9.5* 9.6* 10.5*   HCT 42.6 43.5 42.5  "27.4* 30.1* 28.0* 29.6* 31.8*    311 443 308 320 314 313 301   MCV 97 98 94 100 99 98 99 101*     Recent Labs     06/25/23  0805 06/25/23  0216 06/25/23  0211 06/24/23  1956 05/02/23  1303 05/02/23  0429 05/01/23  0827 04/29/23  0927 04/23/23  1639 04/22/23  1938 07/03/22  0616 07/02/22  1624 10/10/21  0944 08/13/21  0847 05/21/21  0127 05/14/21  1400 12/20/18  1220   INR  --   --   --   --   --   --   --   --   --  1.1  --  2.2* 1.1 1.0  --  1.4* 1.0   HAUF CANCELED 0.6 0.4 0.2  0.2 0.6 0.1 0.4 0.5   < >  --    < >  --   --   --    < >  --   --     < > = values in this interval not displayed.     PTT - No results in last year.    Recent Labs     10/24/24  1615 03/12/24  1603 05/30/23  1237 07/04/22  0647 05/27/21  1536   CHOL 196 211* 275* 181 138   LDLF  --   --  175* 107* 69   HDL 72.7 71.3 76.5 60.3 47.2   TRIG 96 125 118 70 111     No results found for: \"HGBA1C\"  Lab Results   Component Value Date    TSH 0.64 06/21/2024     Imaging  ABEL 4/25/25  Right Lower PVR: Evidence of mild arterial occlusive disease in the right lower extremity at rest. Normal digital perfusion noted. Multiphasic flow is noted in the right common femoral artery, right posterior tibial artery and right dorsalis pedis artery.  Left Lower PVR: Evidence of mild arterial occlusive disease in the left lower extremity at rest. Normal digital perfusion noted. Multiphasic flow is noted in the left common femoral artery, left posterior tibial artery and left dorsalis pedis artery.    ABEL 7/31/23  Right Lower PVR: Evidence of mild arterial occlusive disease in the right lower extremity at rest. Decreased digital perfusion noted. Triphasic flow is noted in the right common femoral artery, right posterior tibial artery and right dorsalis pedis artery.  Left Lower PVR: Evidence of mild arterial occlusive disease in the left lower extremity at rest. Normal digital perfusion noted. Biphasic flow is noted in the left common femoral artery, left " posterior tibial artery and left dorsalis pedis artery.    Assessment/Plan   Rosenda Anderson is a 64 y.o. female with PMHx HTN, HLD, HIV, Hep C, splenectomy, NSCLC dx in 2021 s/p RTX, thyroid nodules and Ex-smoker     PAD   _ Known history of PAD since at least 2013  _ Multiple episodes of LLE CLI requiring multiple interventions   _ 5/2021 left distal SFA/pop stent, with evidence of stent occlusion s/p re-intervention on, dc on ASA + xarelto 2.5 BID, with multiple interventions in [id number], and her medications were changed to Xarelto 20 daily and Plavix. Again evidence of occlusion with distal reconstitution noted on CTA s/p left femoral-pop bypass grafting on 6/23/2023 by Dr. Mccloud  She has been on Xarelto 20 qd, Plavix & Pletal since  _ ASO Rfs: HTN, HLD, Ex-smoker (quit in 20008)    Plan:  -- Monitor for the signs/symptoms that require medical attention  -- Continue Xarelto 20 qd, Plavix & Pletal, monitor for bleeding, fall precautions   -- CBC, CMP   -- ASO risk factors modifications   Target BP <130/80  Continue Lipitor 80 mg/day. Target LDL <70. Repeat Lipid panel ordered   Optimize BG control and continue to avoid smoking. Continue follow up with PCP  -- Continue following with Dr. De La Cruz for graft surveillance   -- If any signs of heart failure, Pletal needs to be revisited  -- Her LE claudication suggests possible underlying neurogenic etiology especially with associated bilateral numbness and tingling, she will further discuss with her PCP  -- Rest as detailed in the patient's instructions    Noemi Gallego MD

## 2025-05-08 LAB
ALBUMIN SERPL-MCNC: 4.4 G/DL (ref 3.6–5.1)
ALP SERPL-CCNC: 86 U/L (ref 37–153)
ALT SERPL-CCNC: 6 U/L (ref 6–29)
ANION GAP SERPL CALCULATED.4IONS-SCNC: 8 MMOL/L (CALC) (ref 7–17)
AST SERPL-CCNC: 15 U/L (ref 10–35)
BILIRUB SERPL-MCNC: 0.5 MG/DL (ref 0.2–1.2)
BUN SERPL-MCNC: 17 MG/DL (ref 7–25)
CALCIUM SERPL-MCNC: 9.2 MG/DL (ref 8.6–10.4)
CHLORIDE SERPL-SCNC: 105 MMOL/L (ref 98–110)
CHOLEST SERPL-MCNC: 151 MG/DL
CHOLEST/HDLC SERPL: 2.5 (CALC)
CO2 SERPL-SCNC: 28 MMOL/L (ref 20–32)
CREAT SERPL-MCNC: 0.91 MG/DL (ref 0.5–1.05)
EGFRCR SERPLBLD CKD-EPI 2021: 70 ML/MIN/1.73M2
ERYTHROCYTE [DISTWIDTH] IN BLOOD BY AUTOMATED COUNT: 12.5 % (ref 11–15)
GLUCOSE SERPL-MCNC: 66 MG/DL (ref 65–139)
HCT VFR BLD AUTO: 44.7 % (ref 35–45)
HDLC SERPL-MCNC: 61 MG/DL
HGB BLD-MCNC: 14.7 G/DL (ref 11.7–15.5)
LDLC SERPL CALC-MCNC: 71 MG/DL (CALC)
MCH RBC QN AUTO: 31.5 PG (ref 27–33)
MCHC RBC AUTO-ENTMCNC: 32.9 G/DL (ref 32–36)
MCV RBC AUTO: 95.7 FL (ref 80–100)
NONHDLC SERPL-MCNC: 90 MG/DL (CALC)
PLATELET # BLD AUTO: 292 THOUSAND/UL (ref 140–400)
PMV BLD REES-ECKER: 9.7 FL (ref 7.5–12.5)
POTASSIUM SERPL-SCNC: 4.4 MMOL/L (ref 3.5–5.3)
PROT SERPL-MCNC: 7.6 G/DL (ref 6.1–8.1)
RBC # BLD AUTO: 4.67 MILLION/UL (ref 3.8–5.1)
SODIUM SERPL-SCNC: 141 MMOL/L (ref 135–146)
TRIGL SERPL-MCNC: 102 MG/DL
WBC # BLD AUTO: 5.9 THOUSAND/UL (ref 3.8–10.8)

## 2025-05-22 ENCOUNTER — TELEPHONE (OUTPATIENT)
Dept: OTOLARYNGOLOGY | Facility: HOSPITAL | Age: 65
End: 2025-05-22
Payer: COMMERCIAL

## 2025-05-22 DIAGNOSIS — E04.1 THYROID NODULE: ICD-10-CM

## 2025-05-22 NOTE — TELEPHONE ENCOUNTER
In doing some early chart prep, I noted that Ms. Anderson is to have a thyroid ultrasound done prior to her upcoming apt with Dr. Panchal.  I noted that has not been scheduled yet.    I called and spoke with her and we arranged the ultrasound for 6/9/25 @ 9:30 a.m. at [facility], [facility] radiology.     She was very appreciative of the call and help with scheduling.

## 2025-06-10 ENCOUNTER — HOSPITAL ENCOUNTER (OUTPATIENT)
Dept: RADIOLOGY | Facility: HOSPITAL | Age: 65
Discharge: HOME | End: 2025-06-10
Payer: COMMERCIAL

## 2025-06-10 ENCOUNTER — DOCUMENTATION (OUTPATIENT)
Dept: IMMUNOLOGY | Facility: CLINIC | Age: 65
End: 2025-06-10
Payer: COMMERCIAL

## 2025-06-10 DIAGNOSIS — Z20.1 TB (TUBERCULOSIS) CONTACT: ICD-10-CM

## 2025-06-10 PROCEDURE — 71046 X-RAY EXAM CHEST 2 VIEWS: CPT

## 2025-06-10 PROCEDURE — 71046 X-RAY EXAM CHEST 2 VIEWS: CPT | Performed by: RADIOLOGY

## 2025-06-10 NOTE — PROGRESS NOTES
Patient called and spoke to RN.  She thinks she was exposed to TB.  Her cousin has it and she does not think he has been taking his meds.  Patient has had positive ppd in the past.  Will send for chest xray and contact  to see if anything else should be done.  Patient instructed to wear a mask, and good handwashing until we can figure things out.

## 2025-06-17 DIAGNOSIS — E78.5 HYPERLIPIDEMIA, UNSPECIFIED HYPERLIPIDEMIA TYPE: ICD-10-CM

## 2025-06-17 DIAGNOSIS — I70.292 ATHEROSCLEROSIS OF NATIVE ARTERY OF LEFT LOWER EXTREMITY WITH OTHER CLINICAL MANIFESTATION: ICD-10-CM

## 2025-06-17 DIAGNOSIS — I10 HYPERTENSION, UNSPECIFIED TYPE: ICD-10-CM

## 2025-06-17 DIAGNOSIS — B20 HUMAN IMMUNODEFICIENCY VIRUS (MULTI): ICD-10-CM

## 2025-06-17 RX ORDER — HYDROCHLOROTHIAZIDE 25 MG/1
25 TABLET ORAL DAILY
Qty: 30 TABLET | Refills: 3 | Status: SHIPPED | OUTPATIENT
Start: 2025-06-17

## 2025-06-17 RX ORDER — BICTEGRAVIR SODIUM, EMTRICITABINE, AND TENOFOVIR ALAFENAMIDE FUMARATE 50; 200; 25 MG/1; MG/1; MG/1
1 TABLET ORAL DAILY
Qty: 30 TABLET | Refills: 5 | Status: SHIPPED | OUTPATIENT
Start: 2025-06-17

## 2025-06-17 RX ORDER — ATORVASTATIN CALCIUM 80 MG/1
80 TABLET, FILM COATED ORAL DAILY
Qty: 90 TABLET | Refills: 3 | Status: SHIPPED | OUTPATIENT
Start: 2025-06-17

## 2025-06-20 ENCOUNTER — TELEPHONE (OUTPATIENT)
Dept: OTOLARYNGOLOGY | Facility: HOSPITAL | Age: 65
End: 2025-06-20
Payer: COMMERCIAL

## 2025-06-20 DIAGNOSIS — E04.1 THYROID NODULE: ICD-10-CM

## 2025-06-20 NOTE — TELEPHONE ENCOUNTER
Ms. Anderson was a no show for her scheduled follow up apt today with Dr. Panchal.  She also did not show for her scheduled ultrasound either.    I called and spoke with her.    She did report that she forgot about both apts but did wish to reschedule.    I assisted with rescheduling both the ultrasound and apt with Dr. Panchal.  They are schedule in July.  I also mailed her the apt reminders as well.

## 2025-07-03 ENCOUNTER — HOSPITAL ENCOUNTER (OUTPATIENT)
Dept: RADIOLOGY | Facility: HOSPITAL | Age: 65
Discharge: HOME | End: 2025-07-03
Payer: COMMERCIAL

## 2025-07-03 DIAGNOSIS — E04.1 THYROID NODULE: ICD-10-CM

## 2025-07-03 PROCEDURE — 76536 US EXAM OF HEAD AND NECK: CPT

## 2025-07-03 PROCEDURE — 76536 US EXAM OF HEAD AND NECK: CPT | Performed by: STUDENT IN AN ORGANIZED HEALTH CARE EDUCATION/TRAINING PROGRAM

## 2025-07-11 DIAGNOSIS — I10 HYPERTENSION, UNSPECIFIED TYPE: ICD-10-CM

## 2025-07-11 RX ORDER — LISINOPRIL 40 MG/1
40 TABLET ORAL DAILY
Qty: 30 TABLET | Refills: 0 | Status: SHIPPED | OUTPATIENT
Start: 2025-07-11

## 2025-07-25 ENCOUNTER — TELEPHONE (OUTPATIENT)
Dept: OTOLARYNGOLOGY | Facility: HOSPITAL | Age: 65
End: 2025-07-25
Payer: COMMERCIAL

## 2025-07-25 NOTE — TELEPHONE ENCOUNTER
Ms. Anderson was again a no show for her scheduled apt with Dr. Panchal.  She no showed on 6/20/25 as well.    I called her listed number and was sent to the voice mail, however the voice mail box was full.  Unable to leave a message RE: missed apt and how to reschedule.

## 2025-08-13 ENCOUNTER — HOSPITAL ENCOUNTER (EMERGENCY)
Facility: HOSPITAL | Age: 65
Discharge: LEFT BEFORE DISCHARGE | End: 2025-08-14
Payer: COMMERCIAL

## 2025-08-13 ASSESSMENT — PAIN SCALES - GENERAL: PAINLEVEL_OUTOF10: 7

## 2025-08-13 ASSESSMENT — PAIN DESCRIPTION - PAIN TYPE: TYPE: ACUTE PAIN

## 2025-08-13 ASSESSMENT — PAIN - FUNCTIONAL ASSESSMENT: PAIN_FUNCTIONAL_ASSESSMENT: 0-10

## 2025-08-13 ASSESSMENT — PAIN DESCRIPTION - ORIENTATION: ORIENTATION: LEFT

## 2025-08-14 ENCOUNTER — HOSPITAL ENCOUNTER (INPATIENT)
Facility: HOSPITAL | Age: 65
End: 2025-08-14
Attending: STUDENT IN AN ORGANIZED HEALTH CARE EDUCATION/TRAINING PROGRAM | Admitting: STUDENT IN AN ORGANIZED HEALTH CARE EDUCATION/TRAINING PROGRAM
Payer: COMMERCIAL

## 2025-08-14 ENCOUNTER — APPOINTMENT (OUTPATIENT)
Dept: RADIOLOGY | Facility: HOSPITAL | Age: 65
End: 2025-08-14
Payer: COMMERCIAL

## 2025-08-14 PROBLEM — I74.9 ARTERIAL THROMBOSIS (MULTI): Status: ACTIVE | Noted: 2025-08-14

## 2025-08-14 ASSESSMENT — COGNITIVE AND FUNCTIONAL STATUS - GENERAL
CLIMB 3 TO 5 STEPS WITH RAILING: A LITTLE
MOBILITY SCORE: 23
DAILY ACTIVITIY SCORE: 24
PATIENT BASELINE BEDBOUND: NO

## 2025-08-14 ASSESSMENT — ACTIVITIES OF DAILY LIVING (ADL)
GROOMING: INDEPENDENT
ADEQUATE_TO_COMPLETE_ADL: YES
DRESSING YOURSELF: INDEPENDENT
FEEDING YOURSELF: INDEPENDENT
HEARING - RIGHT EAR: FUNCTIONAL
TOILETING: INDEPENDENT
HEARING - LEFT EAR: FUNCTIONAL
JUDGMENT_ADEQUATE_SAFELY_COMPLETE_DAILY_ACTIVITIES: YES
WALKS IN HOME: INDEPENDENT
BATHING: INDEPENDENT
PATIENT'S MEMORY ADEQUATE TO SAFELY COMPLETE DAILY ACTIVITIES?: YES

## 2025-08-14 ASSESSMENT — LIFESTYLE VARIABLES
EVER FELT BAD OR GUILTY ABOUT YOUR DRINKING: NO
TOTAL SCORE: 0
EVER HAD A DRINK FIRST THING IN THE MORNING TO STEADY YOUR NERVES TO GET RID OF A HANGOVER: NO
HAVE PEOPLE ANNOYED YOU BY CRITICIZING YOUR DRINKING: NO
HAVE YOU EVER FELT YOU SHOULD CUT DOWN ON YOUR DRINKING: NO

## 2025-08-14 ASSESSMENT — PAIN DESCRIPTION - LOCATION: LOCATION: LEG

## 2025-08-14 ASSESSMENT — PAIN - FUNCTIONAL ASSESSMENT
PAIN_FUNCTIONAL_ASSESSMENT: 0-10
PAIN_FUNCTIONAL_ASSESSMENT: 0-10

## 2025-08-14 ASSESSMENT — PAIN DESCRIPTION - ORIENTATION: ORIENTATION: LEFT

## 2025-08-14 ASSESSMENT — PAIN SCALES - GENERAL
PAINLEVEL_OUTOF10: 8
PAINLEVEL_OUTOF10: 9

## 2025-08-15 ENCOUNTER — APPOINTMENT (OUTPATIENT)
Dept: RADIOLOGY | Facility: HOSPITAL | Age: 65
End: 2025-08-15
Payer: COMMERCIAL

## 2025-08-15 ENCOUNTER — ANESTHESIA (OUTPATIENT)
Dept: OPERATING ROOM | Facility: HOSPITAL | Age: 65
End: 2025-08-15
Payer: COMMERCIAL

## 2025-08-15 ENCOUNTER — ANESTHESIA EVENT (OUTPATIENT)
Dept: OPERATING ROOM | Facility: HOSPITAL | Age: 65
End: 2025-08-15
Payer: COMMERCIAL

## 2025-08-15 PROCEDURE — 2500000004 HC RX 250 GENERAL PHARMACY W/ HCPCS (ALT 636 FOR OP/ED): Mod: SE

## 2025-08-15 PROCEDURE — 2500000004 HC RX 250 GENERAL PHARMACY W/ HCPCS (ALT 636 FOR OP/ED): Mod: SE | Performed by: ANESTHESIOLOGIST ASSISTANT

## 2025-08-15 RX ORDER — MIDAZOLAM HYDROCHLORIDE 2 MG/2ML
INJECTION, SOLUTION INTRAMUSCULAR; INTRAVENOUS AS NEEDED
Status: DISCONTINUED | OUTPATIENT
Start: 2025-08-15 | End: 2025-08-15

## 2025-08-15 RX ORDER — CEFAZOLIN 1 G/1
INJECTION, POWDER, FOR SOLUTION INTRAVENOUS AS NEEDED
Status: DISCONTINUED | OUTPATIENT
Start: 2025-08-15 | End: 2025-08-15

## 2025-08-15 RX ORDER — LIDOCAINE HCL/PF 100 MG/5ML
SYRINGE (ML) INTRAVENOUS AS NEEDED
Status: DISCONTINUED | OUTPATIENT
Start: 2025-08-15 | End: 2025-08-15

## 2025-08-15 RX ORDER — ONDANSETRON HYDROCHLORIDE 2 MG/ML
INJECTION, SOLUTION INTRAVENOUS AS NEEDED
Status: DISCONTINUED | OUTPATIENT
Start: 2025-08-15 | End: 2025-08-15

## 2025-08-15 RX ORDER — PHENYLEPHRINE HYDROCHLORIDE 10 MG/ML
INJECTION INTRAVENOUS AS NEEDED
Status: DISCONTINUED | OUTPATIENT
Start: 2025-08-15 | End: 2025-08-15

## 2025-08-15 RX ORDER — ESMOLOL HYDROCHLORIDE 10 MG/ML
INJECTION INTRAVENOUS AS NEEDED
Status: DISCONTINUED | OUTPATIENT
Start: 2025-08-15 | End: 2025-08-15

## 2025-08-15 RX ORDER — FENTANYL CITRATE 50 UG/ML
INJECTION, SOLUTION INTRAMUSCULAR; INTRAVENOUS AS NEEDED
Status: DISCONTINUED | OUTPATIENT
Start: 2025-08-15 | End: 2025-08-15

## 2025-08-15 RX ORDER — HYDROMORPHONE HYDROCHLORIDE 1 MG/ML
INJECTION, SOLUTION INTRAMUSCULAR; INTRAVENOUS; SUBCUTANEOUS AS NEEDED
Status: DISCONTINUED | OUTPATIENT
Start: 2025-08-15 | End: 2025-08-15

## 2025-08-15 RX ORDER — HEPARIN SODIUM 1000 [USP'U]/ML
INJECTION, SOLUTION INTRAVENOUS; SUBCUTANEOUS AS NEEDED
Status: DISCONTINUED | OUTPATIENT
Start: 2025-08-15 | End: 2025-08-15

## 2025-08-15 RX ORDER — PROPOFOL 10 MG/ML
INJECTION, EMULSION INTRAVENOUS AS NEEDED
Status: DISCONTINUED | OUTPATIENT
Start: 2025-08-15 | End: 2025-08-15

## 2025-08-15 RX ORDER — ROCURONIUM BROMIDE 10 MG/ML
INJECTION, SOLUTION INTRAVENOUS AS NEEDED
Status: DISCONTINUED | OUTPATIENT
Start: 2025-08-15 | End: 2025-08-15

## 2025-08-15 RX ADMIN — DEXAMETHASONE SODIUM PHOSPHATE 4 MG: 4 INJECTION INTRA-ARTICULAR; INTRALESIONAL; INTRAMUSCULAR; INTRAVENOUS; SOFT TISSUE at 14:30

## 2025-08-15 RX ADMIN — ROCURONIUM BROMIDE 20 MG: 10 INJECTION, SOLUTION INTRAVENOUS at 16:20

## 2025-08-15 RX ADMIN — PHENYLEPHRINE HYDROCHLORIDE 80 MCG: 10 INJECTION INTRAVENOUS at 18:10

## 2025-08-15 RX ADMIN — ESMOLOL HYDROCHLORIDE 30 MG: 10 INJECTION, SOLUTION INTRAVENOUS at 15:10

## 2025-08-15 RX ADMIN — PROPOFOL 150 MG: 10 INJECTION, EMULSION INTRAVENOUS at 14:05

## 2025-08-15 RX ADMIN — HYDROMORPHONE HYDROCHLORIDE 0.5 MG: 1 INJECTION, SOLUTION INTRAMUSCULAR; INTRAVENOUS; SUBCUTANEOUS at 17:25

## 2025-08-15 RX ADMIN — ONDANSETRON 4 MG: 2 INJECTION INTRAMUSCULAR; INTRAVENOUS at 18:17

## 2025-08-15 RX ADMIN — CEFAZOLIN 2 G: 1 INJECTION, POWDER, FOR SOLUTION INTRAMUSCULAR; INTRAVENOUS at 18:16

## 2025-08-15 RX ADMIN — SODIUM CHLORIDE, SODIUM LACTATE, POTASSIUM CHLORIDE, AND CALCIUM CHLORIDE: 600; 310; 30; 20 INJECTION, SOLUTION INTRAVENOUS at 14:00

## 2025-08-15 RX ADMIN — HYDROMORPHONE HYDROCHLORIDE 0.5 MG: 1 INJECTION, SOLUTION INTRAMUSCULAR; INTRAVENOUS; SUBCUTANEOUS at 15:23

## 2025-08-15 RX ADMIN — SUGAMMADEX 200 MG: 100 INJECTION, SOLUTION INTRAVENOUS at 18:43

## 2025-08-15 RX ADMIN — PHENYLEPHRINE HYDROCHLORIDE 80 MCG: 10 INJECTION INTRAVENOUS at 17:49

## 2025-08-15 RX ADMIN — ESMOLOL HYDROCHLORIDE 60 MG: 10 INJECTION, SOLUTION INTRAVENOUS at 18:41

## 2025-08-15 RX ADMIN — ESMOLOL HYDROCHLORIDE 50 MG: 10 INJECTION, SOLUTION INTRAVENOUS at 14:06

## 2025-08-15 RX ADMIN — CEFAZOLIN 2 G: 1 INJECTION, POWDER, FOR SOLUTION INTRAMUSCULAR; INTRAVENOUS at 14:16

## 2025-08-15 RX ADMIN — HEPARIN SODIUM 2000 UNITS: 1000 INJECTION, SOLUTION INTRAVENOUS; SUBCUTANEOUS at 16:05

## 2025-08-15 RX ADMIN — ROCURONIUM BROMIDE 10 MG: 10 INJECTION, SOLUTION INTRAVENOUS at 15:23

## 2025-08-15 RX ADMIN — ROCURONIUM BROMIDE 10 MG: 10 INJECTION, SOLUTION INTRAVENOUS at 17:55

## 2025-08-15 RX ADMIN — SODIUM CHLORIDE, SODIUM LACTATE, POTASSIUM CHLORIDE, AND CALCIUM CHLORIDE: 600; 310; 30; 20 INJECTION, SOLUTION INTRAVENOUS at 17:13

## 2025-08-15 RX ADMIN — ROCURONIUM BROMIDE 50 MG: 10 INJECTION, SOLUTION INTRAVENOUS at 14:05

## 2025-08-15 RX ADMIN — LIDOCAINE HYDROCHLORIDE 50 MG: 20 INJECTION INTRAVENOUS at 14:05

## 2025-08-15 RX ADMIN — FENTANYL CITRATE 50 MCG: 50 INJECTION, SOLUTION INTRAMUSCULAR; INTRAVENOUS at 14:38

## 2025-08-15 RX ADMIN — FENTANYL CITRATE 50 MCG: 50 INJECTION, SOLUTION INTRAMUSCULAR; INTRAVENOUS at 14:05

## 2025-08-15 RX ADMIN — HEPARIN SODIUM 5000 UNITS: 1000 INJECTION, SOLUTION INTRAVENOUS; SUBCUTANEOUS at 15:37

## 2025-08-15 RX ADMIN — PHENYLEPHRINE HYDROCHLORIDE 80 MCG: 10 INJECTION INTRAVENOUS at 17:12

## 2025-08-15 RX ADMIN — MIDAZOLAM HYDROCHLORIDE 1 MG: 2 INJECTION, SOLUTION INTRAMUSCULAR; INTRAVENOUS at 14:04

## 2025-08-15 RX ADMIN — PROPOFOL 30 MG: 10 INJECTION, EMULSION INTRAVENOUS at 18:41

## 2025-08-15 SDOH — SOCIAL STABILITY: SOCIAL INSECURITY: HAVE YOU HAD ANY THOUGHTS OF HARMING ANYONE ELSE?: NO

## 2025-08-15 SDOH — SOCIAL STABILITY: SOCIAL INSECURITY: HAVE YOU HAD THOUGHTS OF HARMING ANYONE ELSE?: NO

## 2025-08-15 SDOH — ECONOMIC STABILITY: FOOD INSECURITY: WITHIN THE PAST 12 MONTHS, THE FOOD YOU BOUGHT JUST DIDN'T LAST AND YOU DIDN'T HAVE MONEY TO GET MORE.: NEVER TRUE

## 2025-08-15 SDOH — ECONOMIC STABILITY: FOOD INSECURITY: WITHIN THE PAST 12 MONTHS, YOU WORRIED THAT YOUR FOOD WOULD RUN OUT BEFORE YOU GOT THE MONEY TO BUY MORE.: NEVER TRUE

## 2025-08-15 SDOH — SOCIAL STABILITY: SOCIAL INSECURITY: WITHIN THE LAST YEAR, HAVE YOU BEEN AFRAID OF YOUR PARTNER OR EX-PARTNER?: NO

## 2025-08-15 SDOH — ECONOMIC STABILITY: INCOME INSECURITY: IN THE PAST 12 MONTHS HAS THE ELECTRIC, GAS, OIL, OR WATER COMPANY THREATENED TO SHUT OFF SERVICES IN YOUR HOME?: NO

## 2025-08-15 SDOH — ECONOMIC STABILITY: HOUSING INSECURITY: IN THE PAST 12 MONTHS, HOW MANY TIMES HAVE YOU MOVED WHERE YOU WERE LIVING?: 0

## 2025-08-15 SDOH — SOCIAL STABILITY: SOCIAL INSECURITY
WITHIN THE LAST YEAR, HAVE YOU BEEN KICKED, HIT, SLAPPED, OR OTHERWISE PHYSICALLY HURT BY YOUR PARTNER OR EX-PARTNER?: NO

## 2025-08-15 SDOH — SOCIAL STABILITY: SOCIAL INSECURITY: WITHIN THE LAST YEAR, HAVE YOU BEEN HUMILIATED OR EMOTIONALLY ABUSED IN OTHER WAYS BY YOUR PARTNER OR EX-PARTNER?: NO

## 2025-08-15 SDOH — SOCIAL STABILITY: SOCIAL INSECURITY: HAS ANYONE EVER THREATENED TO HURT YOUR FAMILY OR YOUR PETS?: NO

## 2025-08-15 SDOH — ECONOMIC STABILITY: HOUSING INSECURITY: AT ANY TIME IN THE PAST 12 MONTHS, WERE YOU HOMELESS OR LIVING IN A SHELTER (INCLUDING NOW)?: NO

## 2025-08-15 SDOH — SOCIAL STABILITY: SOCIAL INSECURITY
WITHIN THE LAST YEAR, HAVE YOU BEEN RAPED OR FORCED TO HAVE ANY KIND OF SEXUAL ACTIVITY BY YOUR PARTNER OR EX-PARTNER?: NO

## 2025-08-15 SDOH — SOCIAL STABILITY: SOCIAL INSECURITY: ARE YOU OR HAVE YOU BEEN THREATENED OR ABUSED PHYSICALLY, EMOTIONALLY, OR SEXUALLY BY ANYONE?: NO

## 2025-08-15 SDOH — SOCIAL STABILITY: SOCIAL INSECURITY: DOES ANYONE TRY TO KEEP YOU FROM HAVING/CONTACTING OTHER FRIENDS OR DOING THINGS OUTSIDE YOUR HOME?: NO

## 2025-08-15 SDOH — ECONOMIC STABILITY: HOUSING INSECURITY: IN THE LAST 12 MONTHS, WAS THERE A TIME WHEN YOU WERE NOT ABLE TO PAY THE MORTGAGE OR RENT ON TIME?: NO

## 2025-08-15 SDOH — SOCIAL STABILITY: SOCIAL INSECURITY: DO YOU FEEL UNSAFE GOING BACK TO THE PLACE WHERE YOU ARE LIVING?: NO

## 2025-08-15 SDOH — ECONOMIC STABILITY: FOOD INSECURITY: HOW HARD IS IT FOR YOU TO PAY FOR THE VERY BASICS LIKE FOOD, HOUSING, MEDICAL CARE, AND HEATING?: NOT HARD AT ALL

## 2025-08-15 SDOH — SOCIAL STABILITY: SOCIAL INSECURITY: ARE THERE ANY APPARENT SIGNS OF INJURIES/BEHAVIORS THAT COULD BE RELATED TO ABUSE/NEGLECT?: NO

## 2025-08-15 SDOH — SOCIAL STABILITY: SOCIAL INSECURITY: DO YOU FEEL ANYONE HAS EXPLOITED OR TAKEN ADVANTAGE OF YOU FINANCIALLY OR OF YOUR PERSONAL PROPERTY?: NO

## 2025-08-15 SDOH — SOCIAL STABILITY: SOCIAL INSECURITY: WERE YOU ABLE TO COMPLETE ALL THE BEHAVIORAL HEALTH SCREENINGS?: YES

## 2025-08-15 SDOH — SOCIAL STABILITY: SOCIAL INSECURITY: ABUSE: ADULT

## 2025-08-15 SDOH — HEALTH STABILITY: MENTAL HEALTH: CURRENT SMOKER: 1

## 2025-08-15 SDOH — ECONOMIC STABILITY: TRANSPORTATION INSECURITY: IN THE PAST 12 MONTHS, HAS LACK OF TRANSPORTATION KEPT YOU FROM MEDICAL APPOINTMENTS OR FROM GETTING MEDICATIONS?: NO

## 2025-08-15 ASSESSMENT — PAIN SCALES - GENERAL
PAINLEVEL_OUTOF10: 6
PAINLEVEL_OUTOF10: 5 - MODERATE PAIN
PAINLEVEL_OUTOF10: 6
PAINLEVEL_OUTOF10: 6
PAINLEVEL_OUTOF10: 10 - WORST POSSIBLE PAIN
PAINLEVEL_OUTOF10: 4
PAINLEVEL_OUTOF10: 6
PAINLEVEL_OUTOF10: 7
PAIN_LEVEL: 0
PAINLEVEL_OUTOF10: 3
PAINLEVEL_OUTOF10: 8
PAINLEVEL_OUTOF10: 4
PAINLEVEL_OUTOF10: 5 - MODERATE PAIN

## 2025-08-15 ASSESSMENT — PAIN - FUNCTIONAL ASSESSMENT
PAIN_FUNCTIONAL_ASSESSMENT: 0-10
PAIN_FUNCTIONAL_ASSESSMENT: 0-10
PAIN_FUNCTIONAL_ASSESSMENT: UNABLE TO SELF-REPORT
PAIN_FUNCTIONAL_ASSESSMENT: UNABLE TO SELF-REPORT
PAIN_FUNCTIONAL_ASSESSMENT: 0-10
PAIN_FUNCTIONAL_ASSESSMENT: UNABLE TO SELF-REPORT
PAIN_FUNCTIONAL_ASSESSMENT: 0-10
PAIN_FUNCTIONAL_ASSESSMENT: UNABLE TO SELF-REPORT
PAIN_FUNCTIONAL_ASSESSMENT: UNABLE TO SELF-REPORT
PAIN_FUNCTIONAL_ASSESSMENT: 0-10
PAIN_FUNCTIONAL_ASSESSMENT: UNABLE TO SELF-REPORT

## 2025-08-15 ASSESSMENT — LIFESTYLE VARIABLES
AUDIT-C TOTAL SCORE: 0
HOW OFTEN DO YOU HAVE 6 OR MORE DRINKS ON ONE OCCASION: NEVER
AUDIT-C TOTAL SCORE: 0
SKIP TO QUESTIONS 9-10: 1
HOW MANY STANDARD DRINKS CONTAINING ALCOHOL DO YOU HAVE ON A TYPICAL DAY: PATIENT DOES NOT DRINK
HOW OFTEN DO YOU HAVE A DRINK CONTAINING ALCOHOL: NEVER

## 2025-08-15 ASSESSMENT — COGNITIVE AND FUNCTIONAL STATUS - GENERAL
MOBILITY SCORE: 23
CLIMB 3 TO 5 STEPS WITH RAILING: A LITTLE
DAILY ACTIVITIY SCORE: 24

## 2025-08-15 ASSESSMENT — PAIN DESCRIPTION - LOCATION
LOCATION: GROIN
LOCATION: LEG
LOCATION: GROIN
LOCATION: LEG

## 2025-08-15 ASSESSMENT — PATIENT HEALTH QUESTIONNAIRE - PHQ9
2. FEELING DOWN, DEPRESSED OR HOPELESS: NOT AT ALL
1. LITTLE INTEREST OR PLEASURE IN DOING THINGS: NOT AT ALL
SUM OF ALL RESPONSES TO PHQ9 QUESTIONS 1 & 2: 0

## 2025-08-15 ASSESSMENT — ACTIVITIES OF DAILY LIVING (ADL)
LACK_OF_TRANSPORTATION: NO

## 2025-08-15 ASSESSMENT — PAIN DESCRIPTION - DESCRIPTORS
DESCRIPTORS: ACHING;SORE
DESCRIPTORS: SORE;ACHING

## 2025-08-15 ASSESSMENT — PAIN DESCRIPTION - ORIENTATION
ORIENTATION: LEFT

## 2025-08-16 ENCOUNTER — APPOINTMENT (OUTPATIENT)
Dept: CARDIOLOGY | Facility: HOSPITAL | Age: 65
End: 2025-08-16
Payer: COMMERCIAL

## 2025-08-16 ASSESSMENT — COGNITIVE AND FUNCTIONAL STATUS - GENERAL
CLIMB 3 TO 5 STEPS WITH RAILING: A LITTLE
MOVING TO AND FROM BED TO CHAIR: A LITTLE
STANDING UP FROM CHAIR USING ARMS: A LITTLE
MOVING TO AND FROM BED TO CHAIR: A LITTLE
DAILY ACTIVITIY SCORE: 24
WALKING IN HOSPITAL ROOM: A LITTLE
MOBILITY SCORE: 20
MOBILITY SCORE: 20
DAILY ACTIVITIY SCORE: 24
WALKING IN HOSPITAL ROOM: A LITTLE
MOBILITY SCORE: 24
STANDING UP FROM CHAIR USING ARMS: A LITTLE
DAILY ACTIVITIY SCORE: 24
CLIMB 3 TO 5 STEPS WITH RAILING: A LITTLE

## 2025-08-16 ASSESSMENT — PAIN - FUNCTIONAL ASSESSMENT
PAIN_FUNCTIONAL_ASSESSMENT: 0-10

## 2025-08-16 ASSESSMENT — PAIN SCALES - GENERAL
PAINLEVEL_OUTOF10: 8
PAINLEVEL_OUTOF10: 7
PAINLEVEL_OUTOF10: 10 - WORST POSSIBLE PAIN
PAINLEVEL_OUTOF10: 3
PAINLEVEL_OUTOF10: 7
PAINLEVEL_OUTOF10: 8

## 2025-08-16 ASSESSMENT — PAIN DESCRIPTION - DESCRIPTORS: DESCRIPTORS: ACHING;SORE

## 2025-08-16 ASSESSMENT — PAIN DESCRIPTION - LOCATION
LOCATION: LEG
LOCATION: LEG

## 2025-08-16 ASSESSMENT — PAIN DESCRIPTION - ORIENTATION
ORIENTATION: LEFT
ORIENTATION: LEFT

## 2025-08-17 PROBLEM — I74.9 ARTERIAL THROMBOSIS (MULTI): Status: RESOLVED | Noted: 2025-08-14 | Resolved: 2025-08-17

## 2025-08-17 ASSESSMENT — COGNITIVE AND FUNCTIONAL STATUS - GENERAL
WALKING IN HOSPITAL ROOM: A LITTLE
MOBILITY SCORE: 20
DAILY ACTIVITIY SCORE: 24
MOBILITY SCORE: 20
MOVING TO AND FROM BED TO CHAIR: A LITTLE
WALKING IN HOSPITAL ROOM: A LITTLE
DAILY ACTIVITIY SCORE: 24
CLIMB 3 TO 5 STEPS WITH RAILING: A LITTLE
STANDING UP FROM CHAIR USING ARMS: A LITTLE
MOVING TO AND FROM BED TO CHAIR: A LITTLE
CLIMB 3 TO 5 STEPS WITH RAILING: A LITTLE
STANDING UP FROM CHAIR USING ARMS: A LITTLE

## 2025-08-17 ASSESSMENT — PAIN SCALES - GENERAL
PAINLEVEL_OUTOF10: 10 - WORST POSSIBLE PAIN
PAINLEVEL_OUTOF10: 6
PAINLEVEL_OUTOF10: 10 - WORST POSSIBLE PAIN
PAINLEVEL_OUTOF10: 4
PAINLEVEL_OUTOF10: 9

## 2025-08-17 ASSESSMENT — PAIN - FUNCTIONAL ASSESSMENT
PAIN_FUNCTIONAL_ASSESSMENT: 0-10

## 2025-08-17 ASSESSMENT — PAIN DESCRIPTION - ORIENTATION
ORIENTATION: LEFT
ORIENTATION: LEFT

## 2025-08-17 ASSESSMENT — PAIN DESCRIPTION - LOCATION
LOCATION: LEG
LOCATION: LEG

## 2025-08-18 ENCOUNTER — PHARMACY VISIT (OUTPATIENT)
Dept: PHARMACY | Facility: CLINIC | Age: 65
End: 2025-08-18
Payer: MEDICAID

## 2025-08-18 DIAGNOSIS — Z98.890 CRITICAL LIMB ISCHEMIA WITH HISTORY OF REVASCULARIZATION OF SAME EXTREMITY: Primary | ICD-10-CM

## 2025-08-18 DIAGNOSIS — I70.229 CRITICAL LIMB ISCHEMIA WITH HISTORY OF REVASCULARIZATION OF SAME EXTREMITY: Primary | ICD-10-CM

## 2025-08-18 PROCEDURE — RXMED WILLOW AMBULATORY MEDICATION CHARGE

## 2025-08-18 ASSESSMENT — COGNITIVE AND FUNCTIONAL STATUS - GENERAL
MOVING TO AND FROM BED TO CHAIR: A LITTLE
CLIMB 3 TO 5 STEPS WITH RAILING: A LITTLE
DAILY ACTIVITIY SCORE: 24
MOBILITY SCORE: 20
WALKING IN HOSPITAL ROOM: A LITTLE
STANDING UP FROM CHAIR USING ARMS: A LITTLE

## 2025-08-18 ASSESSMENT — PAIN DESCRIPTION - ORIENTATION: ORIENTATION: LEFT

## 2025-08-18 ASSESSMENT — PAIN DESCRIPTION - LOCATION: LOCATION: LEG

## 2025-08-18 ASSESSMENT — PAIN SCALES - GENERAL
PAINLEVEL_OUTOF10: 10 - WORST POSSIBLE PAIN
PAINLEVEL_OUTOF10: 4

## 2025-08-18 ASSESSMENT — PAIN - FUNCTIONAL ASSESSMENT
PAIN_FUNCTIONAL_ASSESSMENT: 0-10
PAIN_FUNCTIONAL_ASSESSMENT: 0-10

## 2025-08-19 ENCOUNTER — PATIENT OUTREACH (OUTPATIENT)
Dept: CARE COORDINATION | Facility: CLINIC | Age: 65
End: 2025-08-19
Payer: COMMERCIAL

## 2025-08-19 DIAGNOSIS — I70.229 CRITICAL LIMB ISCHEMIA WITH HISTORY OF REVASCULARIZATION OF SAME EXTREMITY: ICD-10-CM

## 2025-08-19 DIAGNOSIS — I10 HYPERTENSION, UNSPECIFIED TYPE: ICD-10-CM

## 2025-08-19 DIAGNOSIS — Z98.890 CRITICAL LIMB ISCHEMIA WITH HISTORY OF REVASCULARIZATION OF SAME EXTREMITY: ICD-10-CM

## 2025-08-19 RX ORDER — LISINOPRIL 40 MG/1
40 TABLET ORAL DAILY
Qty: 30 TABLET | Refills: 0 | Status: SHIPPED | OUTPATIENT
Start: 2025-08-19

## 2025-08-19 SDOH — ECONOMIC STABILITY: GENERAL: WOULD YOU LIKE HELP WITH ANY OF THE FOLLOWING NEEDS?: I DONT NEED HELP WITH ANY OF THESE

## 2025-08-21 ENCOUNTER — TELEPHONE (OUTPATIENT)
Dept: CARDIOLOGY | Facility: CLINIC | Age: 65
End: 2025-08-21
Payer: COMMERCIAL

## 2025-08-21 DIAGNOSIS — Z51.81 ANTICOAGULATION MANAGEMENT ENCOUNTER: Primary | ICD-10-CM

## 2025-08-21 DIAGNOSIS — Z79.01 ANTICOAGULATION MANAGEMENT ENCOUNTER: Primary | ICD-10-CM

## 2025-08-21 LAB — LMWH PPP CHRO-ACNC: 1.56 IU/ML

## 2025-08-25 ENCOUNTER — HOSPITAL ENCOUNTER (INPATIENT)
Facility: HOSPITAL | Age: 65
LOS: 2 days | Discharge: HOME | End: 2025-08-27
Attending: STUDENT IN AN ORGANIZED HEALTH CARE EDUCATION/TRAINING PROGRAM | Admitting: STUDENT IN AN ORGANIZED HEALTH CARE EDUCATION/TRAINING PROGRAM
Payer: COMMERCIAL

## 2025-08-25 ENCOUNTER — CLINICAL SUPPORT (OUTPATIENT)
Dept: EMERGENCY MEDICINE | Facility: HOSPITAL | Age: 65
End: 2025-08-25
Payer: COMMERCIAL

## 2025-08-25 ENCOUNTER — APPOINTMENT (OUTPATIENT)
Dept: RADIOLOGY | Facility: HOSPITAL | Age: 65
End: 2025-08-25
Payer: COMMERCIAL

## 2025-08-25 PROBLEM — T14.8XXA HEMATOMA: Status: ACTIVE | Noted: 2025-08-25

## 2025-08-25 ASSESSMENT — PAIN SCALES - GENERAL: PAINLEVEL_OUTOF10: 0 - NO PAIN

## 2025-08-25 ASSESSMENT — PAIN - FUNCTIONAL ASSESSMENT: PAIN_FUNCTIONAL_ASSESSMENT: 0-10

## 2025-08-26 ENCOUNTER — ANESTHESIA EVENT (OUTPATIENT)
Dept: OPERATING ROOM | Facility: HOSPITAL | Age: 65
End: 2025-08-26
Payer: COMMERCIAL

## 2025-08-26 ENCOUNTER — ANESTHESIA (OUTPATIENT)
Dept: OPERATING ROOM | Facility: HOSPITAL | Age: 65
End: 2025-08-26
Payer: COMMERCIAL

## 2025-08-26 PROCEDURE — 2500000004 HC RX 250 GENERAL PHARMACY W/ HCPCS (ALT 636 FOR OP/ED): Mod: SE | Performed by: ANESTHESIOLOGIST ASSISTANT

## 2025-08-26 PROCEDURE — 2500000004 HC RX 250 GENERAL PHARMACY W/ HCPCS (ALT 636 FOR OP/ED): Mod: SE

## 2025-08-26 RX ORDER — CEFAZOLIN 1 G/1
INJECTION, POWDER, FOR SOLUTION INTRAVENOUS AS NEEDED
Status: DISCONTINUED | OUTPATIENT
Start: 2025-08-26 | End: 2025-08-26

## 2025-08-26 RX ORDER — ONDANSETRON HYDROCHLORIDE 2 MG/ML
INJECTION, SOLUTION INTRAVENOUS AS NEEDED
Status: DISCONTINUED | OUTPATIENT
Start: 2025-08-26 | End: 2025-08-26

## 2025-08-26 RX ORDER — LIDOCAINE HYDROCHLORIDE 10 MG/ML
INJECTION, SOLUTION INFILTRATION; PERINEURAL AS NEEDED
Status: DISCONTINUED | OUTPATIENT
Start: 2025-08-26 | End: 2025-08-26

## 2025-08-26 RX ORDER — MIDAZOLAM HYDROCHLORIDE 2 MG/2ML
INJECTION, SOLUTION INTRAMUSCULAR; INTRAVENOUS AS NEEDED
Status: DISCONTINUED | OUTPATIENT
Start: 2025-08-26 | End: 2025-08-26

## 2025-08-26 RX ORDER — PROPOFOL 10 MG/ML
INJECTION, EMULSION INTRAVENOUS AS NEEDED
Status: DISCONTINUED | OUTPATIENT
Start: 2025-08-26 | End: 2025-08-26

## 2025-08-26 RX ORDER — ESMOLOL HYDROCHLORIDE 10 MG/ML
INJECTION INTRAVENOUS AS NEEDED
Status: DISCONTINUED | OUTPATIENT
Start: 2025-08-26 | End: 2025-08-26

## 2025-08-26 RX ORDER — FENTANYL CITRATE 50 UG/ML
INJECTION, SOLUTION INTRAMUSCULAR; INTRAVENOUS AS NEEDED
Status: DISCONTINUED | OUTPATIENT
Start: 2025-08-26 | End: 2025-08-26

## 2025-08-26 RX ORDER — LIDOCAINE HYDROCHLORIDE 20 MG/ML
INJECTION, SOLUTION INFILTRATION; PERINEURAL AS NEEDED
Status: DISCONTINUED | OUTPATIENT
Start: 2025-08-26 | End: 2025-08-26

## 2025-08-26 RX ORDER — PHENYLEPHRINE HYDROCHLORIDE 10 MG/ML
INJECTION INTRAVENOUS AS NEEDED
Status: DISCONTINUED | OUTPATIENT
Start: 2025-08-26 | End: 2025-08-26

## 2025-08-26 RX ADMIN — MIDAZOLAM HYDROCHLORIDE 2 MG: 2 INJECTION, SOLUTION INTRAMUSCULAR; INTRAVENOUS at 16:47

## 2025-08-26 RX ADMIN — PROPOFOL 130 MG: 10 INJECTION, EMULSION INTRAVENOUS at 16:53

## 2025-08-26 RX ADMIN — SODIUM CHLORIDE, SODIUM LACTATE, POTASSIUM CHLORIDE, AND CALCIUM CHLORIDE: 600; 310; 30; 20 INJECTION, SOLUTION INTRAVENOUS at 16:53

## 2025-08-26 RX ADMIN — ONDANSETRON 4 MG: 2 INJECTION, SOLUTION INTRAMUSCULAR; INTRAVENOUS at 17:00

## 2025-08-26 RX ADMIN — PHENYLEPHRINE HYDROCHLORIDE 80 MCG: 10 INJECTION INTRAVENOUS at 17:28

## 2025-08-26 RX ADMIN — FENTANYL CITRATE 50 MCG: 50 INJECTION, SOLUTION INTRAMUSCULAR; INTRAVENOUS at 17:08

## 2025-08-26 RX ADMIN — FENTANYL CITRATE 25 MCG: 50 INJECTION, SOLUTION INTRAMUSCULAR; INTRAVENOUS at 16:53

## 2025-08-26 RX ADMIN — CEFAZOLIN 2 G: 1 INJECTION, POWDER, FOR SOLUTION INTRAMUSCULAR; INTRAVENOUS at 17:00

## 2025-08-26 RX ADMIN — PROPOFOL 20 MG: 10 INJECTION, EMULSION INTRAVENOUS at 17:17

## 2025-08-26 RX ADMIN — ESMOLOL HYDROCHLORIDE 20 MG: 10 INJECTION, SOLUTION INTRAVENOUS at 17:19

## 2025-08-26 RX ADMIN — DEXAMETHASONE SODIUM PHOSPHATE 4 MG: 4 INJECTION INTRA-ARTICULAR; INTRALESIONAL; INTRAMUSCULAR; INTRAVENOUS; SOFT TISSUE at 17:00

## 2025-08-26 RX ADMIN — PROPOFOL 50 MG: 10 INJECTION, EMULSION INTRAVENOUS at 17:08

## 2025-08-26 RX ADMIN — LIDOCAINE HYDROCHLORIDE 5 ML: 20 INJECTION, SOLUTION INFILTRATION; PERINEURAL at 16:53

## 2025-08-26 RX ADMIN — FENTANYL CITRATE 25 MCG: 50 INJECTION, SOLUTION INTRAMUSCULAR; INTRAVENOUS at 17:17

## 2025-08-26 SDOH — ECONOMIC STABILITY: HOUSING INSECURITY: AT ANY TIME IN THE PAST 12 MONTHS, WERE YOU HOMELESS OR LIVING IN A SHELTER (INCLUDING NOW)?: NO

## 2025-08-26 SDOH — HEALTH STABILITY: PHYSICAL HEALTH: ON AVERAGE, HOW MANY DAYS PER WEEK DO YOU ENGAGE IN MODERATE TO STRENUOUS EXERCISE (LIKE A BRISK WALK)?: 7 DAYS

## 2025-08-26 SDOH — ECONOMIC STABILITY: HOUSING INSECURITY: IN THE PAST 12 MONTHS, HOW MANY TIMES HAVE YOU MOVED WHERE YOU WERE LIVING?: 0

## 2025-08-26 SDOH — SOCIAL STABILITY: SOCIAL INSECURITY: ARE YOU MARRIED, WIDOWED, DIVORCED, SEPARATED, NEVER MARRIED, OR LIVING WITH A PARTNER?: NEVER MARRIED

## 2025-08-26 SDOH — HEALTH STABILITY: PHYSICAL HEALTH
HOW OFTEN DO YOU NEED TO HAVE SOMEONE HELP YOU WHEN YOU READ INSTRUCTIONS, PAMPHLETS, OR OTHER WRITTEN MATERIAL FROM YOUR DOCTOR OR PHARMACY?: NEVER

## 2025-08-26 SDOH — SOCIAL STABILITY: SOCIAL NETWORK
DO YOU BELONG TO ANY CLUBS OR ORGANIZATIONS SUCH AS CHURCH GROUPS, UNIONS, FRATERNAL OR ATHLETIC GROUPS, OR SCHOOL GROUPS?: NO

## 2025-08-26 SDOH — HEALTH STABILITY: MENTAL HEALTH
DO YOU FEEL STRESS - TENSE, RESTLESS, NERVOUS, OR ANXIOUS, OR UNABLE TO SLEEP AT NIGHT BECAUSE YOUR MIND IS TROUBLED ALL THE TIME - THESE DAYS?: NOT AT ALL

## 2025-08-26 SDOH — SOCIAL STABILITY: SOCIAL INSECURITY: WITHIN THE LAST YEAR, HAVE YOU BEEN AFRAID OF YOUR PARTNER OR EX-PARTNER?: NO

## 2025-08-26 SDOH — SOCIAL STABILITY: SOCIAL NETWORK: HOW OFTEN DO YOU ATTEND MEETINGS OF THE CLUBS OR ORGANIZATIONS YOU BELONG TO?: NEVER

## 2025-08-26 SDOH — SOCIAL STABILITY: SOCIAL INSECURITY: WERE YOU ABLE TO COMPLETE ALL THE BEHAVIORAL HEALTH SCREENINGS?: YES

## 2025-08-26 SDOH — ECONOMIC STABILITY: FOOD INSECURITY: HOW HARD IS IT FOR YOU TO PAY FOR THE VERY BASICS LIKE FOOD, HOUSING, MEDICAL CARE, AND HEATING?: NOT VERY HARD

## 2025-08-26 SDOH — SOCIAL STABILITY: SOCIAL INSECURITY: DO YOU FEEL UNSAFE GOING BACK TO THE PLACE WHERE YOU ARE LIVING?: NO

## 2025-08-26 SDOH — ECONOMIC STABILITY: FOOD INSECURITY: WITHIN THE PAST 12 MONTHS, THE FOOD YOU BOUGHT JUST DIDN'T LAST AND YOU DIDN'T HAVE MONEY TO GET MORE.: NEVER TRUE

## 2025-08-26 SDOH — ECONOMIC STABILITY: HOUSING INSECURITY: IN THE LAST 12 MONTHS, WAS THERE A TIME WHEN YOU WERE NOT ABLE TO PAY THE MORTGAGE OR RENT ON TIME?: NO

## 2025-08-26 SDOH — ECONOMIC STABILITY: INCOME INSECURITY: IN THE PAST 12 MONTHS HAS THE ELECTRIC, GAS, OIL, OR WATER COMPANY THREATENED TO SHUT OFF SERVICES IN YOUR HOME?: NO

## 2025-08-26 SDOH — SOCIAL STABILITY: SOCIAL INSECURITY: HAVE YOU HAD THOUGHTS OF HARMING ANYONE ELSE?: NO

## 2025-08-26 SDOH — ECONOMIC STABILITY: FOOD INSECURITY: WITHIN THE PAST 12 MONTHS, YOU WORRIED THAT YOUR FOOD WOULD RUN OUT BEFORE YOU GOT THE MONEY TO BUY MORE.: NEVER TRUE

## 2025-08-26 SDOH — SOCIAL STABILITY: SOCIAL NETWORK
IN A TYPICAL WEEK, HOW MANY TIMES DO YOU TALK ON THE PHONE WITH FAMILY, FRIENDS, OR NEIGHBORS?: MORE THAN THREE TIMES A WEEK

## 2025-08-26 SDOH — SOCIAL STABILITY: SOCIAL INSECURITY: WITHIN THE LAST YEAR, HAVE YOU BEEN HUMILIATED OR EMOTIONALLY ABUSED IN OTHER WAYS BY YOUR PARTNER OR EX-PARTNER?: NO

## 2025-08-26 SDOH — SOCIAL STABILITY: SOCIAL INSECURITY: ARE THERE ANY APPARENT SIGNS OF INJURIES/BEHAVIORS THAT COULD BE RELATED TO ABUSE/NEGLECT?: NO

## 2025-08-26 SDOH — ECONOMIC STABILITY: TRANSPORTATION INSECURITY: IN THE PAST 12 MONTHS, HAS LACK OF TRANSPORTATION KEPT YOU FROM MEDICAL APPOINTMENTS OR FROM GETTING MEDICATIONS?: NO

## 2025-08-26 SDOH — SOCIAL STABILITY: SOCIAL NETWORK: HOW OFTEN DO YOU ATTEND CHURCH OR RELIGIOUS SERVICES?: 1 TO 4 TIMES PER YEAR

## 2025-08-26 SDOH — SOCIAL STABILITY: SOCIAL INSECURITY: DOES ANYONE TRY TO KEEP YOU FROM HAVING/CONTACTING OTHER FRIENDS OR DOING THINGS OUTSIDE YOUR HOME?: NO

## 2025-08-26 SDOH — HEALTH STABILITY: MENTAL HEALTH: CURRENT SMOKER: 1

## 2025-08-26 SDOH — SOCIAL STABILITY: SOCIAL INSECURITY: ARE YOU OR HAVE YOU BEEN THREATENED OR ABUSED PHYSICALLY, EMOTIONALLY, OR SEXUALLY BY ANYONE?: NO

## 2025-08-26 SDOH — SOCIAL STABILITY: SOCIAL INSECURITY: DO YOU FEEL ANYONE HAS EXPLOITED OR TAKEN ADVANTAGE OF YOU FINANCIALLY OR OF YOUR PERSONAL PROPERTY?: NO

## 2025-08-26 SDOH — SOCIAL STABILITY: SOCIAL NETWORK: HOW OFTEN DO YOU GET TOGETHER WITH FRIENDS OR RELATIVES?: MORE THAN THREE TIMES A WEEK

## 2025-08-26 SDOH — HEALTH STABILITY: PHYSICAL HEALTH: ON AVERAGE, HOW MANY MINUTES DO YOU ENGAGE IN EXERCISE AT THIS LEVEL?: 10 MIN

## 2025-08-26 SDOH — SOCIAL STABILITY: SOCIAL INSECURITY: HAVE YOU HAD ANY THOUGHTS OF HARMING ANYONE ELSE?: NO

## 2025-08-26 SDOH — SOCIAL STABILITY: SOCIAL INSECURITY: ABUSE: ADULT

## 2025-08-26 SDOH — SOCIAL STABILITY: SOCIAL INSECURITY: HAS ANYONE EVER THREATENED TO HURT YOUR FAMILY OR YOUR PETS?: NO

## 2025-08-26 ASSESSMENT — PAIN - FUNCTIONAL ASSESSMENT
PAIN_FUNCTIONAL_ASSESSMENT: UNABLE TO SELF-REPORT
PAIN_FUNCTIONAL_ASSESSMENT: 0-10
PAIN_FUNCTIONAL_ASSESSMENT: UNABLE TO SELF-REPORT
PAIN_FUNCTIONAL_ASSESSMENT: 0-10
PAIN_FUNCTIONAL_ASSESSMENT: 0-10
PAIN_FUNCTIONAL_ASSESSMENT: UNABLE TO SELF-REPORT
PAIN_FUNCTIONAL_ASSESSMENT: 0-10
PAIN_FUNCTIONAL_ASSESSMENT: 0-10

## 2025-08-26 ASSESSMENT — COGNITIVE AND FUNCTIONAL STATUS - GENERAL
PATIENT BASELINE BEDBOUND: NO
DAILY ACTIVITIY SCORE: 24
DAILY ACTIVITIY SCORE: 24
MOBILITY SCORE: 24
MOBILITY SCORE: 24

## 2025-08-26 ASSESSMENT — ACTIVITIES OF DAILY LIVING (ADL)
ADEQUATE_TO_COMPLETE_ADL: YES
DRESSING YOURSELF: INDEPENDENT
GROOMING: INDEPENDENT
HEARING - LEFT EAR: FUNCTIONAL
BATHING: INDEPENDENT
TOILETING: INDEPENDENT
JUDGMENT_ADEQUATE_SAFELY_COMPLETE_DAILY_ACTIVITIES: YES
LACK_OF_TRANSPORTATION: NO
PATIENT'S MEMORY ADEQUATE TO SAFELY COMPLETE DAILY ACTIVITIES?: YES
HEARING - RIGHT EAR: FUNCTIONAL
FEEDING YOURSELF: INDEPENDENT
ASSISTIVE_DEVICE: EYEGLASSES
LACK_OF_TRANSPORTATION: NO
WALKS IN HOME: INDEPENDENT

## 2025-08-26 ASSESSMENT — PAIN SCALES - GENERAL
PAINLEVEL_OUTOF10: 8
PAINLEVEL_OUTOF10: 7
PAINLEVEL_OUTOF10: 0 - NO PAIN
PAINLEVEL_OUTOF10: 0 - NO PAIN
PAINLEVEL_OUTOF10: 6
PAINLEVEL_OUTOF10: 0 - NO PAIN
PAIN_LEVEL: 0
PAINLEVEL_OUTOF10: 6
PAINLEVEL_OUTOF10: 8
PAINLEVEL_OUTOF10: 7
PAINLEVEL_OUTOF10: 6

## 2025-08-26 ASSESSMENT — LIFESTYLE VARIABLES
HOW OFTEN DO YOU HAVE 6 OR MORE DRINKS ON ONE OCCASION: NEVER
AUDIT-C TOTAL SCORE: 0
HOW OFTEN DO YOU HAVE A DRINK CONTAINING ALCOHOL: NEVER
SUBSTANCE_ABUSE_PAST_12_MONTHS: NO
SKIP TO QUESTIONS 9-10: 1
PRESCIPTION_ABUSE_PAST_12_MONTHS: NO
HOW MANY STANDARD DRINKS CONTAINING ALCOHOL DO YOU HAVE ON A TYPICAL DAY: PATIENT DOES NOT DRINK
AUDIT-C TOTAL SCORE: 0

## 2025-08-26 ASSESSMENT — PAIN DESCRIPTION - DESCRIPTORS
DESCRIPTORS: ACHING
DESCRIPTORS: ACHING
DESCRIPTORS: OTHER (COMMENT)
DESCRIPTORS: ACHING
DESCRIPTORS: ACHING

## 2025-08-26 ASSESSMENT — PATIENT HEALTH QUESTIONNAIRE - PHQ9
2. FEELING DOWN, DEPRESSED OR HOPELESS: NOT AT ALL
SUM OF ALL RESPONSES TO PHQ9 QUESTIONS 1 & 2: 0
1. LITTLE INTEREST OR PLEASURE IN DOING THINGS: NOT AT ALL

## 2025-08-27 ENCOUNTER — PHARMACY VISIT (OUTPATIENT)
Dept: PHARMACY | Facility: CLINIC | Age: 65
End: 2025-08-27
Payer: MEDICAID

## 2025-08-27 PROCEDURE — RXMED WILLOW AMBULATORY MEDICATION CHARGE

## 2025-08-27 ASSESSMENT — PAIN - FUNCTIONAL ASSESSMENT: PAIN_FUNCTIONAL_ASSESSMENT: 0-10

## 2025-08-27 ASSESSMENT — PAIN SCALES - GENERAL: PAINLEVEL_OUTOF10: 0 - NO PAIN

## 2025-08-28 DIAGNOSIS — Z95.820 PERIPHERAL VASCULAR ANGIOPLASTY STATUS WITH IMPLANTS AND GRAFTS: ICD-10-CM

## 2025-08-28 RX ORDER — CLOPIDOGREL BISULFATE 75 MG/1
75 TABLET ORAL DAILY
Qty: 90 TABLET | Refills: 1 | Status: SHIPPED | OUTPATIENT
Start: 2025-08-28

## 2025-09-02 ENCOUNTER — PATIENT OUTREACH (OUTPATIENT)
Dept: CARE COORDINATION | Facility: CLINIC | Age: 65
End: 2025-09-02
Payer: COMMERCIAL

## 2025-09-02 ENCOUNTER — APPOINTMENT (OUTPATIENT)
Dept: VASCULAR SURGERY | Facility: HOSPITAL | Age: 65
End: 2025-09-02
Payer: COMMERCIAL

## 2026-05-12 ENCOUNTER — APPOINTMENT (OUTPATIENT)
Dept: CARDIOLOGY | Facility: CLINIC | Age: 66
End: 2026-05-12
Payer: COMMERCIAL